# Patient Record
Sex: MALE | Race: WHITE
[De-identification: names, ages, dates, MRNs, and addresses within clinical notes are randomized per-mention and may not be internally consistent; named-entity substitution may affect disease eponyms.]

---

## 2017-05-12 ENCOUNTER — HOSPITAL ENCOUNTER (OUTPATIENT)
Dept: HOSPITAL 17 - HEND | Age: 82
End: 2017-05-12
Attending: INTERNAL MEDICINE
Payer: MEDICARE

## 2017-05-12 VITALS
RESPIRATION RATE: 16 BRPM | SYSTOLIC BLOOD PRESSURE: 124 MMHG | OXYGEN SATURATION: 97 % | HEART RATE: 72 BPM | DIASTOLIC BLOOD PRESSURE: 68 MMHG | TEMPERATURE: 98.4 F

## 2017-05-12 VITALS
DIASTOLIC BLOOD PRESSURE: 74 MMHG | SYSTOLIC BLOOD PRESSURE: 112 MMHG | HEART RATE: 71 BPM | OXYGEN SATURATION: 96 % | RESPIRATION RATE: 16 BRPM

## 2017-05-12 VITALS — HEIGHT: 72 IN | WEIGHT: 197.53 LBS | BODY MASS INDEX: 26.76 KG/M2

## 2017-05-12 VITALS — TEMPERATURE: 97.4 F

## 2017-05-12 DIAGNOSIS — R13.10: Primary | ICD-10-CM

## 2017-05-12 DIAGNOSIS — K22.2: ICD-10-CM

## 2017-05-12 DIAGNOSIS — K29.70: ICD-10-CM

## 2017-05-12 DIAGNOSIS — K44.9: ICD-10-CM

## 2017-05-12 DIAGNOSIS — K31.7: ICD-10-CM

## 2017-05-12 PROCEDURE — 43239 EGD BIOPSY SINGLE/MULTIPLE: CPT

## 2017-05-12 PROCEDURE — 88305 TISSUE EXAM BY PATHOLOGIST: CPT

## 2017-05-12 PROCEDURE — 00740: CPT

## 2017-05-12 PROCEDURE — 88312 SPECIAL STAINS GROUP 1: CPT

## 2017-05-12 PROCEDURE — C1769 GUIDE WIRE: HCPCS

## 2017-05-12 PROCEDURE — 43248 EGD GUIDE WIRE INSERTION: CPT

## 2017-05-12 NOTE — GIPROC
[facility]

[address] [facility]. [facility], [address]

 

 

EGD WITH DILATION PROCEDURE REPORT     EXAM DATE: 05/12/2017

 

PATIENT NAME:          Jose Garcia          MR#:       [id number]

YOB: 1930     VISIT #:     [id number]

ATTENDING:     Amanda Nathan MD     ORDER #:     [id number]

ASSISTANT:      Dio Calzada and Kaitlin Greer      STATUS:

outpatient

 

INDICATIONS:  The patient is a 86 yr old male here for an EGD with dilation due

to dysphagia, history of esophageal stricture

PROCEDURE PERFORMED:     EGD w/ biopsy

EGD w/ dilation of esophagus via guidewire

MEDICATIONS:     None and Per Anesthesia.

TOPICAL ANESTHETIC:      none

 

CONSENT: The patient understands the risks and benefits of the procedure and

understands that these risks include, but are not limited to: sedation,

allergic reaction, infection, perforation and/or bleeding. Alternative means of

evaluation and treatment include, among others: physical exam, x-rays, and/or

surgical intervention. The patient elects to proceed with this endoscopic

procedure.

 



medical equipment was checked for proper function. Hand hygiene and appropriate

measures for infection prevention was taken. After the risks, benefits and

alternatives of the procedure were thoroughly explained, Informed consent was

verified, confirmed and timeout was successfully executed by the treatment

team. The patient was anesthetized with topical anesthesia and the Pentax

[id number] endoscope was introduced through the mouth and advanced to the second

portion of the duodenum.  The instrument was slowly withdrawn as the mucosa was

fully examined.

Gastritis antrum-biopsy

gastric body polyps

stricture distal esophagus

hiatal hernia.

Dilation was performed at gastroesophageal junction.

 

DILATOR:     SIZE(S):     RESISTANCE:     HEME:     APPEARANCE:

Dilator: Savary over guidewire     Size(s): 14,15

COMMENT:

Retroflexed views revealed a hiatal hernia

 

 

 

ADVERSE EVENTS:     There were no complications.

IMPRESSIONS:     1.  Gastritis antrum-biopsy

gastric body polyps

stricture distal esophagus

hiatal hernia

2.  Retroflexed views revealed a hiatal hernia

 

RECOMMENDATIONS:     1.  Await biopsy results.  Biopsy results will not be ready

for 7-10 days.  If you don't hear from us in two weeks, call our office for

biopsy results.

2.  Begin feeding tomorrow

3.  Continue PPI

4.  Dilatations PRN

5.  Restart anticoagulation

REPEAT EXAM:     Return as needed for EGD with dilatation

 

___________________________________

Amanda Nathan MD

eSigned:  Amanda Nathan MD 05/12/2017 11:28 AM

 

 

cc: Farhat Degroot M.D.

 

 

 

 

PATIENT NAME:  Jose Garcia

MR#: [id number]

## 2017-07-22 ENCOUNTER — HOSPITAL ENCOUNTER (INPATIENT)
Dept: HOSPITAL 17 - NEPC | Age: 82
LOS: 10 days | Discharge: HOME HEALTH | DRG: 690 | End: 2017-08-01
Payer: MEDICARE

## 2017-07-22 VITALS
RESPIRATION RATE: 12 BRPM | DIASTOLIC BLOOD PRESSURE: 64 MMHG | HEART RATE: 82 BPM | OXYGEN SATURATION: 98 % | SYSTOLIC BLOOD PRESSURE: 120 MMHG

## 2017-07-22 VITALS
OXYGEN SATURATION: 97 % | RESPIRATION RATE: 14 BRPM | HEART RATE: 86 BPM | SYSTOLIC BLOOD PRESSURE: 118 MMHG | DIASTOLIC BLOOD PRESSURE: 59 MMHG | TEMPERATURE: 98.7 F

## 2017-07-22 VITALS
DIASTOLIC BLOOD PRESSURE: 59 MMHG | HEART RATE: 72 BPM | OXYGEN SATURATION: 97 % | RESPIRATION RATE: 16 BRPM | TEMPERATURE: 98.7 F | SYSTOLIC BLOOD PRESSURE: 118 MMHG

## 2017-07-22 VITALS
HEART RATE: 84 BPM | RESPIRATION RATE: 12 BRPM | OXYGEN SATURATION: 100 % | SYSTOLIC BLOOD PRESSURE: 116 MMHG | DIASTOLIC BLOOD PRESSURE: 60 MMHG

## 2017-07-22 VITALS — HEIGHT: 71 IN | BODY MASS INDEX: 26.85 KG/M2 | WEIGHT: 191.8 LBS

## 2017-07-22 DIAGNOSIS — N39.0: Primary | ICD-10-CM

## 2017-07-22 DIAGNOSIS — N18.2: ICD-10-CM

## 2017-07-22 DIAGNOSIS — K31.3: ICD-10-CM

## 2017-07-22 DIAGNOSIS — Z79.02: ICD-10-CM

## 2017-07-22 DIAGNOSIS — M25.462: ICD-10-CM

## 2017-07-22 DIAGNOSIS — E11.9: ICD-10-CM

## 2017-07-22 DIAGNOSIS — E78.5: ICD-10-CM

## 2017-07-22 DIAGNOSIS — N62: ICD-10-CM

## 2017-07-22 DIAGNOSIS — F03.90: ICD-10-CM

## 2017-07-22 DIAGNOSIS — N48.6: ICD-10-CM

## 2017-07-22 DIAGNOSIS — K57.90: ICD-10-CM

## 2017-07-22 DIAGNOSIS — B37.81: ICD-10-CM

## 2017-07-22 DIAGNOSIS — G25.0: ICD-10-CM

## 2017-07-22 DIAGNOSIS — B37.0: ICD-10-CM

## 2017-07-22 DIAGNOSIS — Z87.891: ICD-10-CM

## 2017-07-22 DIAGNOSIS — K21.0: ICD-10-CM

## 2017-07-22 DIAGNOSIS — I27.2: ICD-10-CM

## 2017-07-22 DIAGNOSIS — B96.4: ICD-10-CM

## 2017-07-22 DIAGNOSIS — K44.9: ICD-10-CM

## 2017-07-22 DIAGNOSIS — N52.9: ICD-10-CM

## 2017-07-22 DIAGNOSIS — K22.2: ICD-10-CM

## 2017-07-22 DIAGNOSIS — I70.0: ICD-10-CM

## 2017-07-22 DIAGNOSIS — I12.9: ICD-10-CM

## 2017-07-22 DIAGNOSIS — R13.10: ICD-10-CM

## 2017-07-22 DIAGNOSIS — K40.90: ICD-10-CM

## 2017-07-22 DIAGNOSIS — R53.1: ICD-10-CM

## 2017-07-22 DIAGNOSIS — R06.6: ICD-10-CM

## 2017-07-22 DIAGNOSIS — I48.91: ICD-10-CM

## 2017-07-22 DIAGNOSIS — N40.0: ICD-10-CM

## 2017-07-22 DIAGNOSIS — I77.89: ICD-10-CM

## 2017-07-22 DIAGNOSIS — K29.70: ICD-10-CM

## 2017-07-22 DIAGNOSIS — Z96.651: ICD-10-CM

## 2017-07-22 LAB
ALP SERPL-CCNC: 79 U/L (ref 45–117)
ALT SERPL-CCNC: 14 U/L (ref 12–78)
ANION GAP SERPL CALC-SCNC: 8 MEQ/L (ref 5–15)
APTT BLD: 31.4 SEC (ref 24.3–30.1)
AST SERPL-CCNC: 15 U/L (ref 15–37)
BACTERIA #/AREA URNS HPF: (no result) /HPF
BASOPHILS # BLD AUTO: 0 TH/MM3 (ref 0–0.2)
BASOPHILS NFR BLD: 0.2 % (ref 0–2)
BILIRUB SERPL-MCNC: 1.4 MG/DL (ref 0.2–1)
BUN SERPL-MCNC: 22 MG/DL (ref 7–18)
CHLORIDE SERPL-SCNC: 104 MEQ/L (ref 98–107)
CK MB SERPL-MCNC: (no result) NG/ML (ref 0.5–3.6)
CK SERPL-CCNC: 131 U/L (ref 39–308)
COLOR UR: YELLOW
COMMENT (UR): (no result)
CULTURE IF INDICATED: (no result)
EOSINOPHIL # BLD: 0 TH/MM3 (ref 0–0.4)
EOSINOPHIL NFR BLD: 0 % (ref 0–4)
ERYTHROCYTE [DISTWIDTH] IN BLOOD BY AUTOMATED COUNT: 13.9 % (ref 11.6–17.2)
GFR SERPLBLD BASED ON 1.73 SQ M-ARVRAT: 64 ML/MIN (ref 89–?)
GLUCOSE UR STRIP-MCNC: (no result) MG/DL
HCO3 BLD-SCNC: 23 MEQ/L (ref 21–32)
HCT VFR BLD CALC: 35.8 % (ref 39–51)
HEMO FLAGS: (no result)
HGB UR QL STRIP: (no result)
INR PPP: 1.2 RATIO
KETONES UR STRIP-MCNC: 10 MG/DL
LYMPHOCYTES # BLD AUTO: 0.7 TH/MM3 (ref 1–4.8)
LYMPHOCYTES NFR BLD AUTO: 4.9 % (ref 9–44)
MAGNESIUM SERPL-MCNC: 1.8 MG/DL (ref 1.5–2.5)
MCH RBC QN AUTO: 33.1 PG (ref 27–34)
MCHC RBC AUTO-ENTMCNC: 33.6 % (ref 32–36)
MCV RBC AUTO: 98.5 FL (ref 80–100)
MONOCYTES NFR BLD: 13.5 % (ref 0–8)
MUCOUS THREADS #/AREA URNS LPF: (no result) /LPF
NEUTROPHILS # BLD AUTO: 11.8 TH/MM3 (ref 1.8–7.7)
NEUTROPHILS NFR BLD AUTO: 81.4 % (ref 16–70)
NITRITE UR QL STRIP: (no result)
PLATELET # BLD: 176 TH/MM3 (ref 150–450)
POTASSIUM SERPL-SCNC: 4 MEQ/L (ref 3.5–5.1)
PROTHROMBIN TIME: 12.8 SEC (ref 9.8–11.6)
RBC # BLD AUTO: 3.63 MIL/MM3 (ref 4.5–5.9)
SODIUM SERPL-SCNC: 135 MEQ/L (ref 136–145)
SP GR UR STRIP: 1.02 (ref 1–1.03)
WBC # BLD AUTO: 14.5 TH/MM3 (ref 4–11)

## 2017-07-22 PROCEDURE — 86592 SYPHILIS TEST NON-TREP QUAL: CPT

## 2017-07-22 PROCEDURE — 70450 CT HEAD/BRAIN W/O DYE: CPT

## 2017-07-22 PROCEDURE — 87086 URINE CULTURE/COLONY COUNT: CPT

## 2017-07-22 PROCEDURE — 88305 TISSUE EXAM BY PATHOLOGIST: CPT

## 2017-07-22 PROCEDURE — 74230 X-RAY XM SWLNG FUNCJ C+: CPT

## 2017-07-22 PROCEDURE — 82607 VITAMIN B-12: CPT

## 2017-07-22 PROCEDURE — 84443 ASSAY THYROID STIM HORMONE: CPT

## 2017-07-22 PROCEDURE — 87077 CULTURE AEROBIC IDENTIFY: CPT

## 2017-07-22 PROCEDURE — 87040 BLOOD CULTURE FOR BACTERIA: CPT

## 2017-07-22 PROCEDURE — 96376 TX/PRO/DX INJ SAME DRUG ADON: CPT

## 2017-07-22 PROCEDURE — 71010: CPT

## 2017-07-22 PROCEDURE — 73502 X-RAY EXAM HIP UNI 2-3 VIEWS: CPT

## 2017-07-22 PROCEDURE — 82552 ASSAY OF CPK IN BLOOD: CPT

## 2017-07-22 PROCEDURE — 96365 THER/PROPH/DIAG IV INF INIT: CPT

## 2017-07-22 PROCEDURE — 93005 ELECTROCARDIOGRAM TRACING: CPT

## 2017-07-22 PROCEDURE — 96366 THER/PROPH/DIAG IV INF ADDON: CPT

## 2017-07-22 PROCEDURE — G0378 HOSPITAL OBSERVATION PER HR: HCPCS

## 2017-07-22 PROCEDURE — 85025 COMPLETE CBC W/AUTO DIFF WBC: CPT

## 2017-07-22 PROCEDURE — 96375 TX/PRO/DX INJ NEW DRUG ADDON: CPT

## 2017-07-22 PROCEDURE — 87186 SC STD MICRODIL/AGAR DIL: CPT

## 2017-07-22 PROCEDURE — 85610 PROTHROMBIN TIME: CPT

## 2017-07-22 PROCEDURE — 80053 COMPREHEN METABOLIC PANEL: CPT

## 2017-07-22 PROCEDURE — 83605 ASSAY OF LACTIC ACID: CPT

## 2017-07-22 PROCEDURE — 76937 US GUIDE VASCULAR ACCESS: CPT

## 2017-07-22 PROCEDURE — 96361 HYDRATE IV INFUSION ADD-ON: CPT

## 2017-07-22 PROCEDURE — 84484 ASSAY OF TROPONIN QUANT: CPT

## 2017-07-22 PROCEDURE — 85730 THROMBOPLASTIN TIME PARTIAL: CPT

## 2017-07-22 PROCEDURE — 82746 ASSAY OF FOLIC ACID SERUM: CPT

## 2017-07-22 PROCEDURE — C1769 GUIDE WIRE: HCPCS

## 2017-07-22 PROCEDURE — 82550 ASSAY OF CK (CPK): CPT

## 2017-07-22 PROCEDURE — 81001 URINALYSIS AUTO W/SCOPE: CPT

## 2017-07-22 PROCEDURE — 84439 ASSAY OF FREE THYROXINE: CPT

## 2017-07-22 PROCEDURE — 83735 ASSAY OF MAGNESIUM: CPT

## 2017-07-22 PROCEDURE — 82140 ASSAY OF AMMONIA: CPT

## 2017-07-22 PROCEDURE — 80048 BASIC METABOLIC PNL TOTAL CA: CPT

## 2017-07-22 PROCEDURE — 88312 SPECIAL STAINS GROUP 1: CPT

## 2017-07-22 PROCEDURE — 73560 X-RAY EXAM OF KNEE 1 OR 2: CPT

## 2017-07-22 NOTE — RADRPT
EXAM DATE/TIME:  07/22/2017 20:26 

 

[facility] COMPARISON:     

CHEST SINGLE AP, October 30, 2016, 1:26.

 

                     

INDICATIONS :     

Syncope. Weakness.

                     

 

MEDICAL HISTORY :     

None.          

 

SURGICAL HISTORY :     

None.   

 

ENCOUNTER:     

Initial                                        

 

ACUITY:     

1 day      

 

PAIN SCORE:     

5/10

 

LOCATION:     

Bilateral chest 

 

FINDINGS:     

A single view of the chest demonstrates the lungs to be symmetrically aerated without evidence of mas
s, infiltrate or effusion.  The cardiomediastinal contours are unremarkable.  Osseous structures are 
intact.

 

CONCLUSION:     No acute disease.  

 

 

 

 Giorgio Lechuga MD on July 22, 2017 at 20:41           

Board Certified Radiologist.

 This report was verified electronically.

## 2017-07-22 NOTE — RADRPT
EXAM DATE/TIME:  07/22/2017 20:39 

 

[facility] COMPARISON:     

No previous studies available for comparison.

 

 

INDICATIONS :     

General weakness.

                      

 

RADIATION DOSE:     

56.35 CTDIvol (mGy) 

 

 

 

MEDICAL HISTORY :     

Dementia. Gastroesophageal reflux disease. Myocardial infarction.Hypertension.

 

SURGICAL HISTORY :      

Appendectomy. Prostatectomy.TURP.

 

ENCOUNTER:      

Initial

 

ACUITY:      

1 day

 

PAIN SCALE:      

0/10

 

LOCATION:        

cranial 

 

TECHNIQUE:     

Multiple contiguous axial images were obtained of the head.  Using automated exposure control and adj
ustment of the mA and/or kV according to patient size, radiation dose was kept as low as reasonably a
chievable to obtain optimal diagnostic quality images.   DICOM format image data is available electro
nically for review and comparison.  

 

FINDINGS:     

There is marked central and cortical atrophy with dilatation of ventricular and sulcal spaces. Scatte
red low densities in white matter.  There is no parenchymal hemorrhage, acute infarction or mass lesi
on identified.  There are no extra-axial fluid collections appreciated.  The posterior fossa is unrem
arkable with midline fourth ventricle.  The portion of the orbits and paranasal sinuses visualized ar
e unremarkable. 

 

CONCLUSION:     

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

 

 

 

 Giorgio Lechuga MD on July 22, 2017 at 20:49           

Board Certified Radiologist.

 This report was verified electronically.

## 2017-07-22 NOTE — PD
HPI


Chief Complaint:  General Weakness


Time Seen by Provider:  20:37


Travel History


International Travel<30 days:  No


Contact w/Intl Traveler<30days:  No


Traveled to known affect area:  No





History of Present Illness


HPI


86-year-old male presents to the emergency department for evaluation of 

generalized weakness.  Patient has history of atrial fibrillation, hypertension

, diabetes, hyperlipidemia.  He is on Pradaxa for his A. fib.  The family 

denies any injury.  According to family, he was fine yesterday.  He saw Dr. Yanez who took him off his galantamine for dementia.  However, his 

weakness started before he was supposed to take the next dose of his 

galantamine.  Family states he has been so weak that he is hardly able to walk.

  He has had decreased appetite.  The family states that they have been having 

to help him walk which is not normal for him.  He normally gets around fine 

with his cane.  He denies any fevers or headache.  No chest pressure or 

shortness of breath.  No abdominal pain.  No nausea, vomiting, diarrhea.  

Patient is following asleep often during my exam.  They state that his symptoms 

started around 4:30 this morning.





PFSH


Past Medical History


Hx Anticoagulant Therapy:  Yes


Arthritis:  Yes


Cancer:  Yes


Cardiovascular Problems:  Yes (a fib)


High Cholesterol:  Yes


Chemotherapy:  No


Cerebrovascular Accident:  No


Diabetes:  No


Diminished Hearing:  No


Endocrine:  No


Gastrointestinal Disorders:  Yes (Reflux)


GERD:  Yes


Genitourinary:  No


Hepatitis:  No


Hiatal Hernia:  No


Hypertension:  Yes


Immune Disorder:  No


Implanted Vascular Access Dvce:  Yes


Musculoskeletal:  Yes (ARTHRITIS)


Neurologic:  Yes (DEMENTIA)


Psychiatric:  No


Reproductive:  No


Respiratory:  No


Thyroid Disease:  No





Past Surgical History


Abdominal Surgery:  No


AICD:  No


Appendectomy:  Yes


Body Medical Devices:  Right knee


Cardiac Surgery:  No


Ear Surgery:  No


Eye Surgery:  Yes (DETACHED RETINA REPAIR)


Genitourinary Surgery:  Yes


Gynecologic Surgery:  No


Joint Replacement:  Yes (R TKA)


Oral Surgery:  No


Pacemaker:  No


Prostatectomy:  Yes (Partial)


Thoracic Surgery:  No


Other Surgery:  Yes (Right knee, appentecdomy, TURP, esoph stricture stretched)





Social History


Alcohol Use:  Yes (2-3 times weekly)


Tobacco Use:  No (QUIT 1965)


Substance Use:  No





Allergies-Medications


(Allergen,Severity, Reaction):  


Coded Allergies:  


     Sulfa (Verified  Allergy, Severe, MIGRAINES, RED EYES, 7/22/17)


Reported Meds & Prescriptions





Reported Meds & Active Scripts


Active


Pradaxa (Dabigatran) 150 Mg Cap 150 Mg PO BID


     resume on 11/4


Reported


Galantamine ER (Galantamine Hydrobromide) 24 Mg Caper 24 Mg PO DAILY


Vitamin D3 (Cholecalciferol) 1,000 Unit Tab 1,000 Units PO DAILY


Pantoprazole (Pantoprazole Sodium) 40 Mg Tab 40 Mg PO BID


Memantine 10 Mg Tab 10 Mg PO BID


Norvasc (Amlodipine Besylate) 5 Mg Tab 5 Mg PO BID


Lipitor (Atorvastatin Calcium) 40 Mg Tab 40 Mg PO HS


Atenolol 25 Mg Tab 25 Mg PO DAILY








Review of Systems


Except as stated in HPI:  all other systems reviewed are Neg





Physical Exam


Narrative


GENERAL: Well-nourished, well-developed elderly male patient, afebrile.


SKIN: Focused skin assessment warm/dry.


HEAD: Normocephalic.  Atraumatic.


EYES: No scleral icterus. No injection or drainage. 


NECK: Supple, trachea midline. No JVD or lymphadenopathy.


CARDIOVASCULAR: Regular rate and rhythm without murmurs, gallops, or rubs. 


RESPIRATORY: Breath sounds equal bilaterally. No accessory muscle use.  Lungs 

sounds clear to auscultation.


GASTROINTESTINAL: Abdomen soft, non-tender, nondistended. 


MUSCULOSKELETAL: No cyanosis, or edema.  Bilateral upper and lower extremity 

strength 5/5.  All extremities are neurovascularly intact.


BACK: Nontender without obvious deformity. No CVA tenderness.





Data


Data


Last Documented VS





Vital Signs








  Date Time  Temp Pulse Resp B/P Pulse Ox O2 Delivery O2 Flow Rate FiO2


 


7/22/17 22:30  82 12 120/64 98   


 


7/22/17 19:46      Room Air  


 


7/22/17 19:46 98.7       








Orders





 Electrocardiogram (7/22/17 20:15)


Complete Blood Count With Diff (7/22/17 20:15)


Comprehensive Metabolic Panel (7/22/17 20:15)


Iv Access Insert/Monitor (7/22/17 20:15)


Lactic Acid (7/22/17 20:15)


Act Partial Throm Time (Ptt) (7/22/17 20:27)


Prothrombin Time / Inr (Pt) (7/22/17 20:27)


Urinalysis - C+S If Indicated (7/22/17 20:27)


Chest, Single Ap (7/22/17 20:27)


Ct Brain W/O Iv Contrast(Rout) (7/22/17 20:27)


Ecg Monitoring (7/22/17 20:27)


Oximetry (7/22/17 20:27)


Sodium Chloride 0.9% Flush (Ns Flush) (7/22/17 20:30)


Sodium Chlor 0.9% 1000 Ml Inj (Ns 1000 M (7/22/17 20:27)


Blood Culture (7/22/17 20:33)


Ckmb (Isoenzyme) Profile (7/22/17 20:18)


Magnesium (Mg) (7/22/17 20:18)


Troponin I (7/22/17 20:18)


CKMB (7/22/17 20:18)


CKMB% (7/22/17 20:18)


Cath For Specimen (7/22/17 21:40)


Urine Culture (7/22/17 22:09)


Ceftriaxone Inj (Rocephin Inj) (7/22/17 23:00)


Thyroid Stimulating Hormone (7/22/17 22:56)


Free Thyroxine (T4) (7/22/17 22:56)


Rapid Plasma Regin (Rpr) W Ttr (7/22/17 22:56)


Vitamin B12 (7/22/17 22:56)


Folate, Serum (7/22/17 22:56)


Ammonia (7/22/17 22:56)


Place In Observation (7/22/17 )


Code Status (7/22/17 22:56)


Vital Signs (Adult) Q4H (7/22/17 22:56)


Activity Oob With Assistance (7/22/17 22:56)


Cardiac Monitor / Telemetry .CONTINUOUS (7/22/17 22:56)


Diet Heart Healthy (7/23/17 Breakfast)


Sodium Chloride 0.9% Flush (Ns Flush) (7/22/17 23:00)


Sodium Chloride 0.9% Flush (Ns Flush) (7/23/17 09:00)


Acetaminophen (Tylenol) (7/22/17 23:00)


Ondansetron Inj (Zofran Inj) (7/22/17 23:00)


Basic Metabolic Panel (Bmp) (7/23/17 06:00)


Complete Blood Count With Diff (7/23/17 06:00)


Electrocardiogram (7/22/17 22:56)


Pt Request For Service (7/22/17 22:56)


Scd Bilateral/Knee High HUGH.BID (7/22/17 22:56)


Naloxone Inj (Narcan Inj) (7/22/17 23:00)


Magnesium Hydroxide Liq (Milk Of Magnesi (7/22/17 23:00)


Echo 2d Comp With Doppler (7/22/17 )


Amlodipine (Norvasc) (7/23/17 09:00)


Atenolol (Tenormin) (7/23/17 09:00)


Atorvastatin (Lipitor) (7/23/17 21:00)


Cholecalciferol (Vitamin D3) (7/23/17 09:00)


Dabigatran (Pradaxa) (7/23/17 09:00)


Memantine (Namenda) (7/23/17 09:00)


Pantoprazole (Protonix) (7/23/17 09:00)


Admit Order (Ed Use Only) (7/22/17 23:01)





Labs





 Laboratory Tests








Test 7/22/17 7/22/17 7/22/17





 20:18 20:30 22:09


 


White Blood Count 14.5 TH/MM3  


 


Red Blood Count 3.63 MIL/MM3  


 


Hemoglobin 12.0 GM/DL  


 


Hematocrit 35.8 %  


 


Mean Corpuscular Volume 98.5 FL  


 


Mean Corpuscular Hemoglobin 33.1 PG  


 


Mean Corpuscular Hemoglobin 33.6 %  





Concent   


 


Red Cell Distribution Width 13.9 %  


 


Platelet Count 176 TH/MM3  


 


Mean Platelet Volume 9.7 FL  


 


Neutrophils (%) (Auto) 81.4 %  


 


Lymphocytes (%) (Auto) 4.9 %  


 


Monocytes (%) (Auto) 13.5 %  


 


Eosinophils (%) (Auto) 0.0 %  


 


Basophils (%) (Auto) 0.2 %  


 


Neutrophils # (Auto) 11.8 TH/MM3  


 


Lymphocytes # (Auto) 0.7 TH/MM3  


 


Monocytes # (Auto) 1.9 TH/MM3  


 


Eosinophils # (Auto) 0.0 TH/MM3  


 


Basophils # (Auto) 0.0 TH/MM3  


 


CBC Comment DIFF FINAL   


 


Differential Comment    


 


Sodium Level 135 MEQ/L  


 


Potassium Level 4.0 MEQ/L  


 


Chloride Level 104 MEQ/L  


 


Carbon Dioxide Level 23.0 MEQ/L  


 


Anion Gap 8 MEQ/L  


 


Blood Urea Nitrogen 22 MG/DL  


 


Creatinine 1.09 MG/DL  


 


Estimat Glomerular Filtration 64 ML/MIN  





Rate   


 


Random Glucose 163 MG/DL  


 


Lactic Acid Level 1.9 mmol/L  


 


Calcium Level 8.5 MG/DL  


 


Magnesium Level 1.8 MG/DL  


 


Total Bilirubin 1.4 MG/DL  


 


Aspartate Amino Transf 15 U/L  





(AST/SGOT)   


 


Alanine Aminotransferase 14 U/L  





(ALT/SGPT)   


 


Alkaline Phosphatase 79 U/L  


 


Total Creatine Kinase 131 U/L  


 


Creatine Kinase MB LESS THAN 0.5  





 NG/ML  


 


Troponin I LESS THAN 0.02  





 NG/ML  


 


Total Protein 7.2 GM/DL  


 


Albumin 3.1 GM/DL  


 


Prothrombin Time  12.8 SEC 


 


Prothromb Time International  1.2 RATIO 





Ratio   


 


Activated Partial  31.4 SEC 





Thromboplast Time   


 


Urine Color   YELLOW 


 


Urine Turbidity   CLOUDY 


 


Urine pH   8.0 


 


Urine Specific Gravity   1.023 


 


Urine Protein   100 mg/dL


 


Urine Glucose (UA)   NEG mg/dL


 


Urine Ketones   10 mg/dL


 


Urine Occult Blood   SMALL 


 


Urine Nitrite   NEG 


 


Urine Bilirubin   NEG 


 


Urine Urobilinogen   LESS THAN 2.0





   MG/DL


 


Urine Leukocyte Esterase   LARGE 


 


Urine RBC   8 /hpf


 


Urine WBC    /hpf


 


Urine WBC Clumps   MANY 


 


Urine Triple Phosphate   OCC /hpf





Crystals   


 


Urine Bacteria   MANY /hpf


 


Urine Mucus   FEW /lpf


 


Microscopic Urinalysis Comment   CULTURE





   INDICATED











MDM


Medical Decision Making


Medical Screen Exam Complete:  Yes


Emergency Medical Condition:  Yes


Medical Record Reviewed:  Yes


Interpretation(s)


chest x-ray - CONCLUSION:     No acute disease.  





CT brain - CONCLUSION:     


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


Differential Diagnosis


Electrolyte abnormality versus dehydration versus pneumonia versus UTI versus 

sepsis versus intracranial abnormality


Narrative Course


86 year old elderly male presents to the emergency department with his 2 

daughters and wife at bedside for evaluation of generalized weakness that 

started this morning.  EKG shows atrial fibrillation, heart rate 89, no acute 

ST changes.  CBC, CMP, magnesium, CK, troponin, lactic acid, PTT, PT/INR, UA, 

blood cultures 2 are ordered and pending.  Chest x-ray and CT of the brain are 

ordered and pending.  Patient is given normal saline 1 L IV bolus.





CBC shows leukocytosis 14.5, hemoglobin 12.0, hematocrit 35.8, neutrophilia 

81.4.  CMP shows no acute abnormality.  Magnesium is 1.8.  CK is 131.  Troponin 

is less than 0.02.  Lactic acid is 1.9.  Coags show no acute abnormality.  UA 

shows large leukocyte esterase, 8 RBC, innumerable WBC, many wbc clumps, many 

bacteria.  Chest x-ray shows no acute disease.  CT of the brain shows cerebral 

atrophy and chronic ischemic small vessel vasculopathy.





Patient started on Rocephin 1 g IV.  [facility] is paged for admission.





Dr. Bonds accepted admission.





Diagnosis





 Primary Impression:  


 UTI (urinary tract infection)


 Qualified Code:  N30.01 - Acute cystitis with hematuria


 Additional Impression:  


 Generalized weakness





Admitting Information


Admitting Physician Requests:  Daiana Ledesma Jul 22, 2017 20:37

## 2017-07-23 VITALS
SYSTOLIC BLOOD PRESSURE: 130 MMHG | HEART RATE: 87 BPM | TEMPERATURE: 97.9 F | RESPIRATION RATE: 18 BRPM | DIASTOLIC BLOOD PRESSURE: 75 MMHG | OXYGEN SATURATION: 96 %

## 2017-07-23 VITALS
TEMPERATURE: 98.7 F | DIASTOLIC BLOOD PRESSURE: 76 MMHG | OXYGEN SATURATION: 92 % | SYSTOLIC BLOOD PRESSURE: 129 MMHG | RESPIRATION RATE: 18 BRPM | HEART RATE: 68 BPM

## 2017-07-23 VITALS
OXYGEN SATURATION: 96 % | RESPIRATION RATE: 18 BRPM | TEMPERATURE: 98.4 F | DIASTOLIC BLOOD PRESSURE: 67 MMHG | HEART RATE: 74 BPM | SYSTOLIC BLOOD PRESSURE: 129 MMHG

## 2017-07-23 VITALS
SYSTOLIC BLOOD PRESSURE: 137 MMHG | TEMPERATURE: 97.8 F | HEART RATE: 75 BPM | OXYGEN SATURATION: 96 % | DIASTOLIC BLOOD PRESSURE: 66 MMHG | RESPIRATION RATE: 18 BRPM

## 2017-07-23 VITALS
RESPIRATION RATE: 18 BRPM | HEART RATE: 68 BPM | OXYGEN SATURATION: 97 % | TEMPERATURE: 98 F | SYSTOLIC BLOOD PRESSURE: 115 MMHG | DIASTOLIC BLOOD PRESSURE: 67 MMHG

## 2017-07-23 VITALS
DIASTOLIC BLOOD PRESSURE: 65 MMHG | SYSTOLIC BLOOD PRESSURE: 132 MMHG | RESPIRATION RATE: 20 BRPM | HEART RATE: 85 BPM | TEMPERATURE: 97.8 F | OXYGEN SATURATION: 96 %

## 2017-07-23 VITALS — HEART RATE: 82 BPM

## 2017-07-23 VITALS — HEART RATE: 71 BPM

## 2017-07-23 VITALS
TEMPERATURE: 98.1 F | HEART RATE: 92 BPM | SYSTOLIC BLOOD PRESSURE: 130 MMHG | OXYGEN SATURATION: 92 % | DIASTOLIC BLOOD PRESSURE: 74 MMHG | RESPIRATION RATE: 18 BRPM

## 2017-07-23 VITALS — HEART RATE: 79 BPM

## 2017-07-23 VITALS — HEART RATE: 78 BPM

## 2017-07-23 LAB
ANION GAP SERPL CALC-SCNC: 8 MEQ/L (ref 5–15)
BASOPHILS # BLD AUTO: 0.1 TH/MM3 (ref 0–0.2)
BASOPHILS NFR BLD: 0.5 % (ref 0–2)
BUN SERPL-MCNC: 18 MG/DL (ref 7–18)
CHLORIDE SERPL-SCNC: 105 MEQ/L (ref 98–107)
EOSINOPHIL # BLD: 0 TH/MM3 (ref 0–0.4)
EOSINOPHIL NFR BLD: 0 % (ref 0–4)
ERYTHROCYTE [DISTWIDTH] IN BLOOD BY AUTOMATED COUNT: 14 % (ref 11.6–17.2)
GFR SERPLBLD BASED ON 1.73 SQ M-ARVRAT: 87 ML/MIN (ref 89–?)
HCO3 BLD-SCNC: 23.8 MEQ/L (ref 21–32)
HCT VFR BLD CALC: 37.3 % (ref 39–51)
HEMO FLAGS: (no result)
LYMPHOCYTES # BLD AUTO: 1.3 TH/MM3 (ref 1–4.8)
LYMPHOCYTES NFR BLD AUTO: 9.5 % (ref 9–44)
MCH RBC QN AUTO: 33.4 PG (ref 27–34)
MCHC RBC AUTO-ENTMCNC: 34.5 % (ref 32–36)
MCV RBC AUTO: 96.9 FL (ref 80–100)
MONOCYTES NFR BLD: 12.6 % (ref 0–8)
NEUTROPHILS # BLD AUTO: 10.3 TH/MM3 (ref 1.8–7.7)
NEUTROPHILS NFR BLD AUTO: 77.4 % (ref 16–70)
PLATELET # BLD: 167 TH/MM3 (ref 150–450)
POTASSIUM SERPL-SCNC: 3.4 MEQ/L (ref 3.5–5.1)
RBC # BLD AUTO: 3.85 MIL/MM3 (ref 4.5–5.9)
SODIUM SERPL-SCNC: 137 MEQ/L (ref 136–145)
T4 FREE SERPL-MCNC: 1.45 NG/DL (ref 0.76–1.46)
VIT B12 SERPL-MCNC: 217 PG/ML (ref 193–986)
WBC # BLD AUTO: 13.2 TH/MM3 (ref 4–11)

## 2017-07-23 RX ADMIN — MEMANTINE HYDROCHLORIDE SCH MG: 10 TABLET ORAL at 09:00

## 2017-07-23 RX ADMIN — PHENAZOPYRIDINE SCH MG: 100 TABLET ORAL at 17:03

## 2017-07-23 RX ADMIN — PANTOPRAZOLE SCH MG: 40 TABLET, DELAYED RELEASE ORAL at 20:08

## 2017-07-23 RX ADMIN — PANTOPRAZOLE SCH MG: 40 TABLET, DELAYED RELEASE ORAL at 09:00

## 2017-07-23 RX ADMIN — SODIUM BICARBONATE SCH MLS/HR: 84 INJECTION, SOLUTION INTRAVENOUS at 17:03

## 2017-07-23 RX ADMIN — Medication SCH ML: at 20:13

## 2017-07-23 RX ADMIN — DABIGATRAN ETEXILATE MESYLATE SCH MG: 150 CAPSULE ORAL at 20:08

## 2017-07-23 RX ADMIN — Medication SCH ML: at 09:00

## 2017-07-23 RX ADMIN — VITAMIN D, TAB 1000IU (100/BT) SCH UNITS: 25 TAB at 09:00

## 2017-07-23 RX ADMIN — MEMANTINE HYDROCHLORIDE SCH MG: 10 TABLET ORAL at 20:09

## 2017-07-23 RX ADMIN — ATENOLOL SCH MG: 25 TABLET ORAL at 09:00

## 2017-07-23 RX ADMIN — ATORVASTATIN CALCIUM SCH MG: 40 TABLET, FILM COATED ORAL at 20:08

## 2017-07-23 RX ADMIN — DABIGATRAN ETEXILATE MESYLATE SCH MG: 150 CAPSULE ORAL at 09:00

## 2017-07-23 NOTE — HHI.HP
HPI


Service


[facility]


Primary Care Physician


Farhat Degroot MD


Admission Diagnosis


UTI, generalized weakness


Chief Complaint:  


worsening generalized weakness.


Travel History


International Travel<30 Days:  No


Contact w/Intl Traveler <30 Da:  No


Traveled to Known Affected Are:  No


History of Present Illness


Patient is a pleasant 86-year-old male with multiple medical problems including 

hypertension, atrial fibrillation, hyperlipidemia, and dementia.


Most recently patient was hospitalized at [facility] October 30 through November 3

, 2016 due to esophageal stricture, food bolus obstruction of the esophagus, 

and new onset atrial fibrillation.  Patient now returns to the [facility] ER with 

complaints of worsening generalized weakness.  Patient's wife explains that he 

has been having a gradual decline.  Previously patient ambulated with a cane 

only outside of the house, but now he needs the cane to ambulate even with in 

the house.  His fatigue has greatly worsened over the last few days. Patient 

became fatigued yesterday to the point where he was unable to ambulate without 

significant assistance from his family.  Workup in the ER revealed urinary 

tract infection and patient was started on intravenous Rocephin.  Patient 

admitted to [facility] for further evaluation and treatment.





I reviewed patient's last neurology visit with Dr. Yanez (7/21/17).  

Patient complained of fatigue at that time as well.  Dr. Yanez was 

concerned that possibly patient's weakness could be related to his galantamine 

and asked that the family hold the galantamine for a week and see if his 

symptoms improved.





Patient denies chest pain, palpitations, nausea and vomiting, diaphoresis, or 

worsening shortness of breath.





Past Family Social History


Past Medical History





1) hypertension


2) atrial fibrillation


   - on pradaxa


   - pt follows with Dr. Pang


3) hyperlipidemia


4) dementia


5) esophageal stricture, status post dilation


6) pulmonary hypertension, mild


7) GERD


8) BPH


   - TURP 11/18/13   


   - Pt's Urologist is Dr. Greco


9) diverticulosis


10) Peyronie's disease/erectile dysfunction, patient follows with urology


11) small right inguinal hernia


   - Evaluated by surgery in the past but surgical correction not recommended


12) essential tremor


13) carotid disease, mild


14) chronic kidney disease, stage II


15) bilateral gynecomastia


16) atherosclerosis of the aorta


Past Surgical History





 1. History of Appendectomy


  1939


2. History of Cataract Surgery


  left eye in 2008


3. History of Complete Colonoscopy


  Colonoscopy on: 6-15-04 (diverticuloisis in  the descending and sigmoid


    colon); 7-11-11 (normal colon)


4. History of Diagnostic Esophagogastroduodenoscopy


  12/09/2010 by Dr. Nathan revealed gastritis in the antrum, stricture at the GE


    junction dilated to 14 mm and a sessile polyp in the body of the


    stomach. Pathology revealed mild chronic active gastritis and a hyperplastic


    polyp.f/u in six weeks.EGD on: 10-30-16 (food impaction distal esophagus;


    stricture of distal esophagus); 11-3-16 (dilation of an esophageal stricture

,


    gastritis, gastric polyps, hiatal hernia)


5. History of Discission Of Secondary Membranous Cataract By Laser


6. History of Esophageal Dilation


  patient has had multiple dilations in the past. Last two: 9-25-05; 12-10-9-09


    (EGD with dilation of stricture at GE junction, gastritis, polyp in stomach)


7. History of Repair Of Retinal Detachment


  left eye 1995


8. History of Sigmoidoscopy (Fiberoptic)


9. History of Stress Test By Pharmacologic Challenge


  A myocardial perfusion scan on 10-3-16 was negative with an EF of 68%.


10. History of Total Knee Arthroplasty


  right knee 1998


11. History of Transurethral Resection Of Prostate (TURP)


  TURP on 11-18-13


Reported Medications





Reported Meds & Active Scripts


Active


Pradaxa (Dabigatran) 150 Mg Cap 150 Mg PO BID


     resume on 11/4


Reported


Galantamine ER (Galantamine Hydrobromide) 24 Mg Caper 24 Mg PO DAILY


Vitamin D3 (Cholecalciferol) 1,000 Unit Tab 1,000 Units PO DAILY


Pantoprazole (Pantoprazole Sodium) 40 Mg Tab 40 Mg PO BID


Memantine 10 Mg Tab 10 Mg PO BID


Norvasc (Amlodipine Besylate) 5 Mg Tab 5 Mg PO BID


Lipitor (Atorvastatin Calcium) 40 Mg Tab 40 Mg PO HS


Atenolol 25 Mg Tab 25 Mg PO DAILY


Allergies:  


Coded Allergies:  


     Sulfa (Verified  Allergy, Severe, MIGRAINES, RED EYES, 7/22/17)


Family History


Noncontributory


Social History


- 


- Retired


- Former smoker


- No alcohol use 


- no illicit street drugs


-





Physical Exam


Vital Signs





 Vital Signs








  Date Time  Temp Pulse Resp B/P Pulse Ox O2 Delivery O2 Flow Rate FiO2


 


7/23/17 10:58  78      


 


7/23/17 08:14 97.8 75 18 137/66 96   


 


7/23/17 05:13 98.4 74 18 129/67 96   


 


7/23/17 02:14  79      


 


7/23/17 00:06 98.0 68 18 115/67 97   


 


7/22/17 22:30  82 12 120/64 98   


 


7/22/17 21:20  84 12 116/60 100   


 


7/22/17 19:46  82 16  98 Room Air  


 


7/22/17 19:46 98.7 86 14 118/59 97   


 


7/22/17 19:42 98.7 72 16 118/59 97   








Physical Exam


GENERAL: This is a well-nourished, well-developed patient, in no apparent 

distress.


SKIN: No rashes, ecchymoses or lesions. Cool and dry.


HEAD: Atraumatic. Normocephalic. No temporal or scalp tenderness.


EYES: Pupils equal round and reactive. Extraocular motions intact. No scleral 

icterus. No injection or drainage. 


ENT: Nose without bleeding, purulent drainage or septal hematoma. Throat 

without erythema, tonsillar hypertrophy or exudate. Uvula midline. Airway 

patent.


NECK: Trachea midline. No JVD or lymphadenopathy. Supple, nontender, no 

meningeal signs.


CARDIOVASCULAR: Regular rate and rhythm without murmurs, gallops, or rubs. 


RESPIRATORY: Clear to auscultation. Breath sounds equal bilaterally. No wheezes

, rales, or rhonchi.  


GASTROINTESTINAL: Abdomen soft, non-tender, nondistended. No hepato-splenomegaly

, or palpable masses. No guarding.


MUSCULOSKELETAL: Extremities without clubbing, cyanosis, or edema. No joint 

tenderness, effusion, or edema noted. No calf tenderness. Negative Homans sign 

bilaterally.


NEUROLOGICAL: Awake and alert. Cranial nerves II through XII intact.  Motor and 

sensory grossly within normal limits. Five out of 5 muscle strength in all 

muscle groups.  Normal speech.


Laboratory





Laboratory Tests








Test 7/22/17 7/22/17 7/22/17 7/23/17





 20:18 20:30 22:09 08:15


 


White Blood Count 14.5    13.2 


 


Red Blood Count 3.63    3.85 


 


Hemoglobin 12.0    12.9 


 


Hematocrit 35.8    37.3 


 


Mean Corpuscular Volume 98.5    96.9 


 


Mean Corpuscular Hemoglobin 33.1    33.4 


 


Mean Corpuscular Hemoglobin 33.6    34.5 





Concent    


 


Red Cell Distribution Width 13.9    14.0 


 


Platelet Count 176    167 


 


Mean Platelet Volume 9.7    9.5 


 


Neutrophils (%) (Auto) 81.4    77.4 


 


Lymphocytes (%) (Auto) 4.9    9.5 


 


Monocytes (%) (Auto) 13.5    12.6 


 


Eosinophils (%) (Auto) 0.0    0.0 


 


Basophils (%) (Auto) 0.2    0.5 


 


Neutrophils # (Auto) 11.8    10.3 


 


Lymphocytes # (Auto) 0.7    1.3 


 


Monocytes # (Auto) 1.9    1.7 


 


Eosinophils # (Auto) 0.0    0.0 


 


Basophils # (Auto) 0.0    0.1 


 


CBC Comment DIFF FINAL    DIFF FINAL 


 


Differential Comment      


 


Sodium Level 135    137 


 


Potassium Level 4.0    3.4 


 


Chloride Level 104    105 


 


Carbon Dioxide Level 23.0    23.8 


 


Anion Gap 8    8 


 


Blood Urea Nitrogen 22    18 


 


Creatinine 1.09    0.84 


 


Estimat Glomerular Filtration 64    87 





Rate    


 


Random Glucose 163    121 


 


Lactic Acid Level 1.9    


 


Calcium Level 8.5    8.7 


 


Magnesium Level 1.8    


 


Total Bilirubin 1.4    


 


Aspartate Amino Transf 15    





(AST/SGOT)    


 


Alanine Aminotransferase 14    





(ALT/SGPT)    


 


Alkaline Phosphatase 79    


 


Total Creatine Kinase 131    


 


Creatine Kinase MB LESS THAN 0.5    


 


Troponin I LESS THAN 0.02    


 


Total Protein 7.2    


 


Albumin 3.1    


 


Vitamin B12 Level 217    


 


Folate 15.8    


 


Free Thyroxine 1.45    


 


Thyroid Stimulating Hormone 0.893    





3rd Gen    


 


Prothrombin Time  12.8   


 


Prothromb Time International  1.2   





Ratio    


 


Activated Partial  31.4   





Thromboplast Time    


 


Urine Color   YELLOW  


 


Urine Turbidity   CLOUDY  


 


Urine pH   8.0  


 


Urine Specific Gravity   1.023  


 


Urine Protein   100  


 


Urine Glucose (UA)   NEG  


 


Urine Ketones   10  


 


Urine Occult Blood   SMALL  


 


Urine Nitrite   NEG  


 


Urine Bilirubin   NEG  


 


Urine Urobilinogen   LESS THAN 2.0  


 


Urine Leukocyte Esterase   LARGE  


 


Urine RBC   8  


 


Urine WBC     


 


Urine WBC Clumps   MANY  


 


Urine Triple Phosphate   OCC  





Crystals    


 


Urine Bacteria   MANY  


 


Urine Mucus   FEW  


 


Microscopic Urinalysis Comment   CULTURE 





   INDICATED 


 


Ammonia    14 














 Date/Time Procedure Status





Source Growth 


 


 7/22/17 22:09 Urine Culture Received





Urine Random Urine Pending 


 


 7/22/17 21:38 Aerobic Blood Culture - Preliminary Resulted





Blood Peripheral NO GROWTH IN 1 DAY 





 7/22/17 21:38 Anaerobic Blood Culture - Preliminary Resulted





Blood Peripheral NO GROWTH IN 1 DAY 








Result Diagram:  


7/23/17 0815                                                                   

             7/23/17 0815





Imaging





Last Impressions








Head CT 7/22/17 2027 Signed





Impressions: 





 Service Date/Time:  Saturday, July 22, 2017 20:39 - CONCLUSION:  Cerebral 





 atrophy and chronic ischemic small vessel vasculopathy.     Giorgio Lechuga MD 


 


Chest X-Ray 7/22/17 2027 Signed





Impressions: 





 Service Date/Time:  Saturday, July 22, 2017 20:26 - CONCLUSION: No acute 





 disease.       Giorgio Lechuga MD 











Septic Shock Reassessment


Heart:  Regular rate and rhythm


Lungs:  Clear


Skin:  Warm


Peripheral Pulses:     Bounding Right Radial


         Bounding Left Radial


         Bounding Right Popliteal


         Bounding Left Popliteal


         Bounding Right Dorsalis Pedis


         Bounding Left Dorsalis Pedis


         Bounding Right Posterior Tibial


         Bounding Left Posterior Tibial


Capillary Refill:  Brisk





Assessment and Plan


Problem List:  


(1) UTI (urinary tract infection)


Status:  Acute


Plan:  


- Patient started on Rocephin


- Awaiting urine culture





(2) Generalized weakness


Status:  Acute


Plan:  


- Likely exacerbated by patient's urinary tract infection


- Echocardiogram pending


- Continue physical therapy


- Patient would benefit from SNF at the end of this hospitalization


- anticipate d/c to SNF 7/24/17


- Family specifically would like Mr. Garcia to go to [facility].  I 

will defer that process to case management.





(3) Atrial fibrillation


Status:  Chronic


Plan:  


- Atenolol, pradaxa





(4) HTN (hypertension)


Status:  Chronic


Plan:  


- stable


- metoprolol





(5) Dementia


Status:  Acute


Plan:  


- Patient follows with Dr. Yanez


- Patient instructed by  been awake to hold his gland to mean for a week to 

see if that was causing his fatigue


- Namenda





(6) Diabetes mellitus type 2, diet-controlled


Status:  Chronic


Plan:  


- Diet controlled





(7) Esophageal stricture


Status:  Acute


Plan:  - s/p dilation


- PPI








Problem Qualifiers





(1) UTI (urinary tract infection):  


Qualified Code:  N30.01 - Acute cystitis with hematuria


(2) Atrial fibrillation:  


Qualified Code:  I48.2 - Chronic atrial fibrillation


(3) HTN (hypertension):  


Qualified Code:  I10 - Essential hypertension





Horacio Bonds DO Jul 23, 2017 12:16

## 2017-07-24 VITALS
SYSTOLIC BLOOD PRESSURE: 119 MMHG | RESPIRATION RATE: 18 BRPM | HEART RATE: 83 BPM | DIASTOLIC BLOOD PRESSURE: 84 MMHG | TEMPERATURE: 98.4 F | OXYGEN SATURATION: 94 %

## 2017-07-24 VITALS
HEART RATE: 107 BPM | SYSTOLIC BLOOD PRESSURE: 144 MMHG | RESPIRATION RATE: 20 BRPM | TEMPERATURE: 97.9 F | DIASTOLIC BLOOD PRESSURE: 79 MMHG | OXYGEN SATURATION: 95 %

## 2017-07-24 VITALS
TEMPERATURE: 99 F | RESPIRATION RATE: 18 BRPM | SYSTOLIC BLOOD PRESSURE: 119 MMHG | HEART RATE: 87 BPM | OXYGEN SATURATION: 94 % | DIASTOLIC BLOOD PRESSURE: 68 MMHG

## 2017-07-24 VITALS
RESPIRATION RATE: 14 BRPM | HEART RATE: 88 BPM | SYSTOLIC BLOOD PRESSURE: 131 MMHG | OXYGEN SATURATION: 95 % | DIASTOLIC BLOOD PRESSURE: 68 MMHG | TEMPERATURE: 98.1 F

## 2017-07-24 VITALS
DIASTOLIC BLOOD PRESSURE: 93 MMHG | OXYGEN SATURATION: 92 % | RESPIRATION RATE: 18 BRPM | TEMPERATURE: 98.1 F | HEART RATE: 95 BPM | SYSTOLIC BLOOD PRESSURE: 142 MMHG

## 2017-07-24 VITALS — OXYGEN SATURATION: 95 %

## 2017-07-24 VITALS — HEART RATE: 80 BPM

## 2017-07-24 LAB
ANION GAP SERPL CALC-SCNC: 8 MEQ/L (ref 5–15)
BASOPHILS # BLD AUTO: 0 TH/MM3 (ref 0–0.2)
BASOPHILS NFR BLD: 0.4 % (ref 0–2)
BUN SERPL-MCNC: 18 MG/DL (ref 7–18)
CHLORIDE SERPL-SCNC: 105 MEQ/L (ref 98–107)
EOSINOPHIL # BLD: 0.1 TH/MM3 (ref 0–0.4)
EOSINOPHIL NFR BLD: 0.5 % (ref 0–4)
ERYTHROCYTE [DISTWIDTH] IN BLOOD BY AUTOMATED COUNT: 13.7 % (ref 11.6–17.2)
GFR SERPLBLD BASED ON 1.73 SQ M-ARVRAT: 102 ML/MIN (ref 89–?)
HCO3 BLD-SCNC: 24.1 MEQ/L (ref 21–32)
HCT VFR BLD CALC: 35.9 % (ref 39–51)
HEMO FLAGS: (no result)
LYMPHOCYTES # BLD AUTO: 1.3 TH/MM3 (ref 1–4.8)
LYMPHOCYTES NFR BLD AUTO: 11.7 % (ref 9–44)
MAGNESIUM SERPL-MCNC: 2 MG/DL (ref 1.5–2.5)
MCH RBC QN AUTO: 33.6 PG (ref 27–34)
MCHC RBC AUTO-ENTMCNC: 34.4 % (ref 32–36)
MCV RBC AUTO: 97.9 FL (ref 80–100)
MONOCYTES NFR BLD: 13.4 % (ref 0–8)
NEUTROPHILS # BLD AUTO: 8.1 TH/MM3 (ref 1.8–7.7)
NEUTROPHILS NFR BLD AUTO: 74 % (ref 16–70)
PLATELET # BLD: 184 TH/MM3 (ref 150–450)
POTASSIUM SERPL-SCNC: 3.9 MEQ/L (ref 3.5–5.1)
RAPID PLASMA REAGIN SCREEN: (no result)
RBC # BLD AUTO: 3.67 MIL/MM3 (ref 4.5–5.9)
SODIUM SERPL-SCNC: 137 MEQ/L (ref 136–145)
WBC # BLD AUTO: 11 TH/MM3 (ref 4–11)

## 2017-07-24 RX ADMIN — MEMANTINE HYDROCHLORIDE SCH MG: 10 TABLET ORAL at 08:20

## 2017-07-24 RX ADMIN — Medication SCH ML: at 20:09

## 2017-07-24 RX ADMIN — PHENAZOPYRIDINE SCH MG: 100 TABLET ORAL at 00:58

## 2017-07-24 RX ADMIN — Medication SCH ML: at 08:20

## 2017-07-24 RX ADMIN — ATORVASTATIN CALCIUM SCH MG: 40 TABLET, FILM COATED ORAL at 20:08

## 2017-07-24 RX ADMIN — SODIUM BICARBONATE SCH MLS/HR: 84 INJECTION, SOLUTION INTRAVENOUS at 04:29

## 2017-07-24 RX ADMIN — PHENAZOPYRIDINE SCH MG: 100 TABLET ORAL at 08:20

## 2017-07-24 RX ADMIN — PANTOPRAZOLE SCH MG: 40 TABLET, DELAYED RELEASE ORAL at 08:20

## 2017-07-24 RX ADMIN — ATENOLOL SCH MG: 25 TABLET ORAL at 08:20

## 2017-07-24 RX ADMIN — MEMANTINE HYDROCHLORIDE SCH MG: 10 TABLET ORAL at 20:08

## 2017-07-24 RX ADMIN — DABIGATRAN ETEXILATE MESYLATE SCH MG: 150 CAPSULE ORAL at 08:20

## 2017-07-24 RX ADMIN — PHENAZOPYRIDINE SCH MG: 100 TABLET ORAL at 15:50

## 2017-07-24 RX ADMIN — CIPROFLOXACIN HYDROCHLORIDE SCH MG: 500 TABLET, FILM COATED ORAL at 20:08

## 2017-07-24 RX ADMIN — VITAMIN D, TAB 1000IU (100/BT) SCH UNITS: 25 TAB at 08:20

## 2017-07-24 RX ADMIN — PANTOPRAZOLE SCH MG: 40 TABLET, DELAYED RELEASE ORAL at 20:08

## 2017-07-24 RX ADMIN — DABIGATRAN ETEXILATE MESYLATE SCH MG: 150 CAPSULE ORAL at 20:08

## 2017-07-24 NOTE — HHI.PR
Subjective


Remarks


No new complaints. 


Afebrile


Wife present at bedside and is agreeable to rehab.





Objective


Vitals





 Vital Signs








  Date Time  Temp Pulse Resp B/P Pulse Ox O2 Delivery O2 Flow Rate FiO2


 


7/24/17 08:06     95 Nasal Cannula 2.00 


 


7/24/17 07:43 98.1 88 14 131/68 95   


 


7/24/17 04:24 98.1 95 18 142/93 92   


 


7/23/17 23:45 98.7 68 18 129/76 92   


 


7/23/17 20:23  82      


 


7/23/17 19:38 98.1 92 18 130/74 92   


 


7/23/17 16:10 97.8 85 20 132/65 96   


 


7/23/17 14:56  71      


 


7/23/17 12:00 97.9 87 18 130/75 96   


 


7/23/17 10:58  78      








Result Diagram:  


7/24/17 0706                                                                   

             7/24/17 0706





Other Results





 Laboratory Tests








Test 7/22/17 7/22/17 7/22/17 7/23/17





 20:18 20:30 22:09 08:15


 


White Blood Count 14.5 TH/MM3   13.2 TH/MM3


 


Red Blood Count 3.63 MIL/MM3   3.85 MIL/MM3


 


Hemoglobin 12.0 GM/DL   12.9 GM/DL


 


Hematocrit 35.8 %   37.3 %


 


Mean Corpuscular Volume 98.5 FL   96.9 FL


 


Mean Corpuscular Hemoglobin 33.1 PG   33.4 PG


 


Mean Corpuscular Hemoglobin 33.6 %   34.5 %





Concent    


 


Red Cell Distribution Width 13.9 %   14.0 %


 


Platelet Count 176 TH/MM3   167 TH/MM3


 


Mean Platelet Volume 9.7 FL   9.5 FL


 


Neutrophils (%) (Auto) 81.4 %   77.4 %


 


Lymphocytes (%) (Auto) 4.9 %   9.5 %


 


Monocytes (%) (Auto) 13.5 %   12.6 %


 


Eosinophils (%) (Auto) 0.0 %   0.0 %


 


Basophils (%) (Auto) 0.2 %   0.5 %


 


Neutrophils # (Auto) 11.8 TH/MM3   10.3 TH/MM3


 


Lymphocytes # (Auto) 0.7 TH/MM3   1.3 TH/MM3


 


Monocytes # (Auto) 1.9 TH/MM3   1.7 TH/MM3


 


Eosinophils # (Auto) 0.0 TH/MM3   0.0 TH/MM3


 


Basophils # (Auto) 0.0 TH/MM3   0.1 TH/MM3


 


CBC Comment DIFF FINAL    DIFF FINAL 


 


Differential Comment      


 


Sodium Level 135 MEQ/L   137 MEQ/L


 


Potassium Level 4.0 MEQ/L   3.4 MEQ/L


 


Chloride Level 104 MEQ/L   105 MEQ/L


 


Carbon Dioxide Level 23.0 MEQ/L   23.8 MEQ/L


 


Anion Gap 8 MEQ/L   8 MEQ/L


 


Blood Urea Nitrogen 22 MG/DL   18 MG/DL


 


Creatinine 1.09 MG/DL   0.84 MG/DL


 


Estimat Glomerular Filtration 64 ML/MIN   87 ML/MIN





Rate    


 


Random Glucose 163 MG/DL   121 MG/DL


 


Lactic Acid Level 1.9 mmol/L   


 


Calcium Level 8.5 MG/DL   8.7 MG/DL


 


Magnesium Level 1.8 MG/DL   


 


Total Bilirubin 1.4 MG/DL   


 


Aspartate Amino Transf 15 U/L   





(AST/SGOT)    


 


Alanine Aminotransferase 14 U/L   





(ALT/SGPT)    


 


Alkaline Phosphatase 79 U/L   


 


Total Creatine Kinase 131 U/L   


 


Creatine Kinase MB LESS THAN 0.5   





 NG/ML   


 


Troponin I LESS THAN 0.02   





 NG/ML   


 


Total Protein 7.2 GM/DL   


 


Albumin 3.1 GM/DL   


 


Vitamin B12 Level 217 PG/ML   


 


Folate 15.8 NG/ML   


 


Free Thyroxine 1.45 NG/DL   


 


Thyroid Stimulating Hormone 0.893 uIU/ML   





3rd Gen    


 


Prothrombin Time  12.8 SEC  


 


Prothromb Time International  1.2 RATIO  





Ratio    


 


Activated Partial  31.4 SEC  





Thromboplast Time    


 


Urine Color   YELLOW  


 


Urine Turbidity   CLOUDY  


 


Urine pH   8.0  


 


Urine Specific Gravity   1.023  


 


Urine Protein   100 mg/dL 


 


Urine Glucose (UA)   NEG mg/dL 


 


Urine Ketones   10 mg/dL 


 


Urine Occult Blood   SMALL  


 


Urine Nitrite   NEG  


 


Urine Bilirubin   NEG  


 


Urine Urobilinogen   LESS THAN 2.0 





   MG/DL 


 


Urine Leukocyte Esterase   LARGE  


 


Urine RBC   8 /hpf 


 


Urine WBC    /hpf 


 


Urine WBC Clumps   MANY  


 


Urine Triple Phosphate   OCC /hpf 





Crystals    


 


Urine Bacteria   MANY /hpf 


 


Urine Mucus   FEW /lpf 


 


Microscopic Urinalysis Comment   CULTURE 





   INDICATED 


 


Ammonia    14 MCMOL/L


 


Rapid Plasma Reagin    NON-REACTIVE 


 


    





Test 7/24/17   





 07:06   


 


White Blood Count 11.0 TH/MM3   


 


Red Blood Count 3.67 MIL/MM3   


 


Hemoglobin 12.3 GM/DL   


 


Hematocrit 35.9 %   


 


Mean Corpuscular Volume 97.9 FL   


 


Mean Corpuscular Hemoglobin 33.6 PG   


 


Mean Corpuscular Hemoglobin 34.4 %   





Concent    


 


Red Cell Distribution Width 13.7 %   


 


Platelet Count 184 TH/MM3   


 


Mean Platelet Volume 9.5 FL   


 


Neutrophils (%) (Auto) 74.0 %   


 


Lymphocytes (%) (Auto) 11.7 %   


 


Monocytes (%) (Auto) 13.4 %   


 


Eosinophils (%) (Auto) 0.5 %   


 


Basophils (%) (Auto) 0.4 %   


 


Neutrophils # (Auto) 8.1 TH/MM3   


 


Lymphocytes # (Auto) 1.3 TH/MM3   


 


Monocytes # (Auto) 1.5 TH/MM3   


 


Eosinophils # (Auto) 0.1 TH/MM3   


 


Basophils # (Auto) 0.0 TH/MM3   


 


CBC Comment DIFF FINAL    


 


Differential Comment     


 


Sodium Level 137 MEQ/L   


 


Potassium Level 3.9 MEQ/L   


 


Chloride Level 105 MEQ/L   


 


Carbon Dioxide Level 24.1 MEQ/L   


 


Anion Gap 8 MEQ/L   


 


Blood Urea Nitrogen 18 MG/DL   


 


Creatinine 0.73 MG/DL   


 


Estimat Glomerular Filtration 102 ML/MIN   





Rate    


 


Random Glucose 101 MG/DL   


 


Calcium Level 8.7 MG/DL   


 


Magnesium Level 2.0 MG/DL   








Imaging





Last Impressions








Head CT 7/22/17 2027 Signed





Impressions: 





 Service Date/Time:  Saturday, July 22, 2017 20:39 - CONCLUSION:  Cerebral 





 atrophy and chronic ischemic small vessel vasculopathy.     Giorgio Lechuga MD 


 


Chest X-Ray 7/22/17 2027 Signed





Impressions: 





 Service Date/Time:  Saturday, July 22, 2017 20:26 - CONCLUSION: No acute 





 disease.       Giorgio Lechuga MD 








Objective Remarks


General: NAD, Alert and oriented to self


Chest: CTA


Cardiac: Irregular


Abd: +BS, soft ND/NT


Ext: No edema





A/P


Problem List:  


(1) UTI (urinary tract infection)


Status:  Acute


Plan:  


- Patient started on Rocephin


- Urine culture with Gram Neg rods, awaiting final culture and sensitivities


- Anticipate discharge to SNF once final cultures and sensitivities resulted. 





(2) Generalized weakness


Status:  Acute


Plan:  


- Likely exacerbated by patient's urinary tract infection


- PT recommending rehab at SNF





(3) Atrial fibrillation


Status:  Chronic


Plan:  


- Atenolol, pradaxa





(4) HTN (hypertension)


Status:  Chronic


Plan:  


- stable


- metoprolol





(5) Dementia


Status:  Acute


Plan:  


- Patient follows with Dr. Yanez


- Patient instructed by  been awake to hold his galantamine for a week to 

see if that was causing his fatigue


- Namenda





(6) Diabetes mellitus type 2, diet-controlled


Status:  Chronic


Plan:  


- Diet controlled





(7) Esophageal stricture


Status:  Acute


Plan:  - s/p dilation


- PPI





Assessment and Plan


Patient examined.


Assessment and plan formulated with Sara Roa PA-C.


I agree with the above.


dementia. gen weakness. uti.


d/c to snf in AM





Problem Qualifiers





(1) UTI (urinary tract infection):  


Qualified Code:  N30.01 - Acute cystitis with hematuria


(2) Atrial fibrillation:  


Qualified Code:  I48.2 - Chronic atrial fibrillation


(3) HTN (hypertension):  


Qualified Code:  I10 - Essential hypertension





Sara Roa Jul 24, 2017 11:01


Tan Gutierrez MD Jul 24, 2017 21:50

## 2017-07-24 NOTE — EKG
Date Performed: 07/22/2017       Time Performed: 19:44:59

 

PTAGE:      86 years

 

EKG:      ATRIAL FIBRILLATION WITH ABERRANT CONDUCTION OR VENTRICULAR PREMATURE COMPLEXES NONSPECIFIC
 ST & T-WAVE ABNORMALITY Consider anterolateral ischemia ABNORMAL RHYTHM ECG

 

PREVIOUS TRACING       : 10/29/2016 20.45

 

DOCTOR:   Jonathan Polo  Interpretating Date/Time  07/24/2017 09:29:00

## 2017-07-25 VITALS
DIASTOLIC BLOOD PRESSURE: 72 MMHG | OXYGEN SATURATION: 95 % | RESPIRATION RATE: 20 BRPM | SYSTOLIC BLOOD PRESSURE: 110 MMHG | HEART RATE: 67 BPM | TEMPERATURE: 97.5 F

## 2017-07-25 VITALS
DIASTOLIC BLOOD PRESSURE: 65 MMHG | OXYGEN SATURATION: 94 % | HEART RATE: 89 BPM | RESPIRATION RATE: 18 BRPM | TEMPERATURE: 98.4 F | SYSTOLIC BLOOD PRESSURE: 133 MMHG

## 2017-07-25 VITALS
OXYGEN SATURATION: 94 % | DIASTOLIC BLOOD PRESSURE: 65 MMHG | TEMPERATURE: 98 F | RESPIRATION RATE: 19 BRPM | HEART RATE: 90 BPM | SYSTOLIC BLOOD PRESSURE: 123 MMHG

## 2017-07-25 VITALS — HEART RATE: 82 BPM

## 2017-07-25 VITALS
DIASTOLIC BLOOD PRESSURE: 61 MMHG | RESPIRATION RATE: 19 BRPM | TEMPERATURE: 98.1 F | HEART RATE: 86 BPM | OXYGEN SATURATION: 95 % | SYSTOLIC BLOOD PRESSURE: 114 MMHG

## 2017-07-25 VITALS — HEART RATE: 84 BPM

## 2017-07-25 VITALS — HEART RATE: 86 BPM

## 2017-07-25 VITALS — HEART RATE: 81 BPM

## 2017-07-25 RX ADMIN — PHENAZOPYRIDINE SCH MG: 100 TABLET ORAL at 17:47

## 2017-07-25 RX ADMIN — VITAMIN D, TAB 1000IU (100/BT) SCH UNITS: 25 TAB at 11:04

## 2017-07-25 RX ADMIN — CIPROFLOXACIN HYDROCHLORIDE SCH MG: 500 TABLET, FILM COATED ORAL at 22:01

## 2017-07-25 RX ADMIN — Medication SCH ML: at 22:01

## 2017-07-25 RX ADMIN — Medication SCH ML: at 11:05

## 2017-07-25 RX ADMIN — PANTOPRAZOLE SCH MG: 40 TABLET, DELAYED RELEASE ORAL at 22:01

## 2017-07-25 RX ADMIN — LORATADINE SCH MG: 10 TABLET ORAL at 11:43

## 2017-07-25 RX ADMIN — PANTOPRAZOLE SCH MG: 40 TABLET, DELAYED RELEASE ORAL at 11:04

## 2017-07-25 RX ADMIN — ATENOLOL SCH MG: 25 TABLET ORAL at 11:04

## 2017-07-25 RX ADMIN — CIPROFLOXACIN HYDROCHLORIDE SCH MG: 500 TABLET, FILM COATED ORAL at 11:04

## 2017-07-25 RX ADMIN — MEMANTINE HYDROCHLORIDE SCH MG: 10 TABLET ORAL at 22:01

## 2017-07-25 RX ADMIN — DABIGATRAN ETEXILATE MESYLATE SCH MG: 150 CAPSULE ORAL at 22:30

## 2017-07-25 RX ADMIN — PHENAZOPYRIDINE SCH MG: 100 TABLET ORAL at 11:05

## 2017-07-25 RX ADMIN — ATORVASTATIN CALCIUM SCH MG: 40 TABLET, FILM COATED ORAL at 22:01

## 2017-07-25 RX ADMIN — MEMANTINE HYDROCHLORIDE SCH MG: 10 TABLET ORAL at 11:04

## 2017-07-25 RX ADMIN — PHENAZOPYRIDINE SCH MG: 100 TABLET ORAL at 00:23

## 2017-07-25 RX ADMIN — DABIGATRAN ETEXILATE MESYLATE SCH MG: 150 CAPSULE ORAL at 11:05

## 2017-07-25 RX ADMIN — PHENYTOIN SODIUM SCH MLS/HR: 50 INJECTION INTRAMUSCULAR; INTRAVENOUS at 17:47

## 2017-07-25 NOTE — HHI.PR
Subjective


Remarks


Pt complains of more coughing today and is bringing up a lot of phlegm


Pt feels this is more upper sinus congestion


He is not eating much


Wife now reports that prior to admission he had an episode of coughing/choking 

with eating


 and is concerned about aspiration as he has had aspiration pneumonia in the 

past.





Objective


Vitals





 Vital Signs








  Date Time  Temp Pulse Resp B/P Pulse Ox O2 Delivery O2 Flow Rate FiO2


 


7/25/17 07:10 98.4 89 18 133/65 94   


 


7/25/17 02:19  82      


 


7/24/17 19:03 98.4 83 18 119/84 94   


 


7/24/17 16:00 97.9 107 20 144/79 95   


 


7/24/17 12:00 99.0 87 18 119/68 94   








Result Diagram:  


7/24/17 0706 7/24/17 0706





Other Results





 Laboratory Tests








Test 7/24/17





 07:06


 


White Blood Count 11.0 TH/MM3


 


Red Blood Count 3.67 MIL/MM3


 


Hemoglobin 12.3 GM/DL


 


Hematocrit 35.9 %


 


Mean Corpuscular Volume 97.9 FL


 


Mean Corpuscular Hemoglobin 33.6 PG


 


Mean Corpuscular Hemoglobin 34.4 %





Concent 


 


Red Cell Distribution Width 13.7 %


 


Platelet Count 184 TH/MM3


 


Mean Platelet Volume 9.5 FL


 


Neutrophils (%) (Auto) 74.0 %


 


Lymphocytes (%) (Auto) 11.7 %


 


Monocytes (%) (Auto) 13.4 %


 


Eosinophils (%) (Auto) 0.5 %


 


Basophils (%) (Auto) 0.4 %


 


Neutrophils # (Auto) 8.1 TH/MM3


 


Lymphocytes # (Auto) 1.3 TH/MM3


 


Monocytes # (Auto) 1.5 TH/MM3


 


Eosinophils # (Auto) 0.1 TH/MM3


 


Basophils # (Auto) 0.0 TH/MM3


 


CBC Comment DIFF FINAL 


 


Differential Comment  


 


Sodium Level 137 MEQ/L


 


Potassium Level 3.9 MEQ/L


 


Chloride Level 105 MEQ/L


 


Carbon Dioxide Level 24.1 MEQ/L


 


Anion Gap 8 MEQ/L


 


Blood Urea Nitrogen 18 MG/DL


 


Creatinine 0.73 MG/DL


 


Estimat Glomerular Filtration 102 ML/MIN





Rate 


 


Random Glucose 101 MG/DL


 


Calcium Level 8.7 MG/DL


 


Magnesium Level 2.0 MG/DL








Imaging





Last Impressions








Head CT 7/22/17 2027 Signed





Impressions: 





 Service Date/Time:  Saturday, July 22, 2017 20:39 - CONCLUSION:  Cerebral 





 atrophy and chronic ischemic small vessel vasculopathy.     Giorgio Lechuga MD 


 


Chest X-Ray 7/22/17 2027 Signed





Impressions: 





 Service Date/Time:  Saturday, July 22, 2017 20:26 - CONCLUSION: No acute 





 disease.       Giorgio Lechuga MD 








Objective Remarks


General: NAD, Alert and oriented to self


Chest: CTA


Cardiac: Irregular


Abd: +BS, soft ND/NT


Ext: No edema





A/P


Problem List:  


(1) UTI (urinary tract infection)


Status:  Acute


Plan:  


- Patient started on Rocephin


- Urine culture growing out Proteus Mirabilis with sensitivity to Cipro


- Antibiotics changed to Cipro on 7/24


- Anticipate discharge to SNF once final cultures and sensitivities resulted. 





(2) Cough


Status:  Acute


Plan:  


- Pt with more coughing today and is bringing up a lot of phlegm today


- Pt feels this is more upper sinus congestion


- Denies any dysphagia and coughing with eating but his wife now reports that 

prior to admission he 


 had an episode of coughing/choking with eating and is concerned about 

aspiration as he has had 


 aspiration pneumonia in the past. 


- Check CXR now


- Claritin 








(3) Generalized weakness


Status:  Acute


Plan:  


- Likely exacerbated by patient's urinary tract infection


- PT recommending rehab at SNF





(4) Atrial fibrillation


Status:  Chronic


Plan:  


- Atenolol, pradaxa





(5) HTN (hypertension)


Status:  Chronic


Plan:  


- stable


- metoprolol





(6) Dementia


Status:  Acute


Plan:  


- Patient follows with Dr. Yanez


- Patient instructed by  been awake to hold his galantamine for a week to 

see if that was causing his fatigue


- Namenda





(7) Diabetes mellitus type 2, diet-controlled


Status:  Chronic


Plan:  


- Diet controlled





(8) Esophageal stricture


Status:  Acute


Plan:  - s/p dilation


- PPI





Assessment and Plan


Patient examined.


Assessment and plan formulated with Sara Roa PA-C.


I agree with the above.


uti with AMS better. family concerned with cough. no pna


on cxr. pt thinks it's post nasal gtt. check swallow eval.


d/c to snf.





Problem Qualifiers





(1) UTI (urinary tract infection):  


Qualified Code:  N30.01 - Acute cystitis with hematuria


(2) Atrial fibrillation:  


Qualified Code:  I48.2 - Chronic atrial fibrillation


(3) HTN (hypertension):  


Qualified Code:  I10 - Essential hypertension





Sara Roa Jul 25, 2017 10:37


Tan Gutierrez MD Jul 25, 2017 15:31

## 2017-07-25 NOTE — RADRPT
EXAM DATE/TIME:  07/25/2017 11:33 

 

[facility] COMPARISON:     

CHEST SINGLE AP, July 22, 2017, 20:26.

 

                     

INDICATIONS :     

Cough.

                     

 

MEDICAL HISTORY :     

Gastroesophageal reflux disease.  Hypertension  Myocardial infarction.   Dementia.   

 

SURGICAL HISTORY :     

Appendectomy. Prostatectomy.  TURP.

 

ENCOUNTER:     

Subsequent                                        

 

ACUITY:     

3 days      

 

PAIN SCORE:     

0/10

 

LOCATION:     

Bilateral chest 

 

FINDINGS:     

A single view of the chest demonstrates the lungs to be hypoinflated with some minimal interstitial c
hanges predominantly in the left base. There may be punctate granulomatous type calcifications in the
 left lower lung as well. Lungs are otherwise clear with no acute infiltrate. There is some blunting 
of the left costophrenic angle and therefore, a small effusion or pleural parenchymal scarring cannot
 be excluded. Accounting for low lung lines heart size is normal. Degenerative changes of the dorsal 
spine. Osseous structures are otherwise intact.

 

CONCLUSION:     

1. Minimal interstitial changes in the left lower lung field with no confluent infiltrate.

2. Blunting of the left costophrenic angle may represent pleural parenchymal scarring or a small effu
myriam.

3. Old granulomatous disease. Heart size is normal.

 

 

 

 Domingo Hahn MD on July 25, 2017 at 12:13           

Board Certified Radiologist.

 This report was verified electronically.

## 2017-07-26 VITALS
TEMPERATURE: 97.7 F | HEART RATE: 88 BPM | SYSTOLIC BLOOD PRESSURE: 128 MMHG | DIASTOLIC BLOOD PRESSURE: 76 MMHG | RESPIRATION RATE: 19 BRPM | OXYGEN SATURATION: 95 %

## 2017-07-26 VITALS
HEART RATE: 89 BPM | SYSTOLIC BLOOD PRESSURE: 115 MMHG | RESPIRATION RATE: 18 BRPM | DIASTOLIC BLOOD PRESSURE: 76 MMHG | TEMPERATURE: 97.9 F | OXYGEN SATURATION: 96 %

## 2017-07-26 VITALS
TEMPERATURE: 97 F | OXYGEN SATURATION: 96 % | SYSTOLIC BLOOD PRESSURE: 134 MMHG | DIASTOLIC BLOOD PRESSURE: 78 MMHG | HEART RATE: 69 BPM | RESPIRATION RATE: 18 BRPM

## 2017-07-26 VITALS
DIASTOLIC BLOOD PRESSURE: 80 MMHG | OXYGEN SATURATION: 94 % | SYSTOLIC BLOOD PRESSURE: 137 MMHG | RESPIRATION RATE: 18 BRPM | TEMPERATURE: 97.9 F | HEART RATE: 81 BPM

## 2017-07-26 VITALS
SYSTOLIC BLOOD PRESSURE: 125 MMHG | DIASTOLIC BLOOD PRESSURE: 80 MMHG | RESPIRATION RATE: 18 BRPM | TEMPERATURE: 98.2 F | HEART RATE: 85 BPM | OXYGEN SATURATION: 96 %

## 2017-07-26 VITALS — HEART RATE: 65 BPM

## 2017-07-26 RX ADMIN — DABIGATRAN ETEXILATE MESYLATE SCH MG: 150 CAPSULE ORAL at 20:53

## 2017-07-26 RX ADMIN — ATORVASTATIN CALCIUM SCH MG: 40 TABLET, FILM COATED ORAL at 20:53

## 2017-07-26 RX ADMIN — VITAMIN D, TAB 1000IU (100/BT) SCH UNITS: 25 TAB at 08:57

## 2017-07-26 RX ADMIN — Medication SCH ML: at 08:58

## 2017-07-26 RX ADMIN — PANTOPRAZOLE SCH MG: 40 TABLET, DELAYED RELEASE ORAL at 08:54

## 2017-07-26 RX ADMIN — PHENAZOPYRIDINE SCH MG: 100 TABLET ORAL at 17:23

## 2017-07-26 RX ADMIN — PHENYTOIN SODIUM SCH MLS/HR: 50 INJECTION INTRAMUSCULAR; INTRAVENOUS at 12:45

## 2017-07-26 RX ADMIN — CIPROFLOXACIN HYDROCHLORIDE SCH MG: 500 TABLET, FILM COATED ORAL at 08:57

## 2017-07-26 RX ADMIN — PHENAZOPYRIDINE SCH MG: 100 TABLET ORAL at 01:38

## 2017-07-26 RX ADMIN — MEMANTINE HYDROCHLORIDE SCH MG: 10 TABLET ORAL at 08:56

## 2017-07-26 RX ADMIN — MEMANTINE HYDROCHLORIDE SCH MG: 10 TABLET ORAL at 20:53

## 2017-07-26 RX ADMIN — ATENOLOL SCH MG: 25 TABLET ORAL at 08:54

## 2017-07-26 RX ADMIN — PHENAZOPYRIDINE SCH MG: 100 TABLET ORAL at 08:54

## 2017-07-26 RX ADMIN — CIPROFLOXACIN HYDROCHLORIDE SCH MG: 500 TABLET, FILM COATED ORAL at 20:53

## 2017-07-26 RX ADMIN — Medication SCH ML: at 20:55

## 2017-07-26 RX ADMIN — DABIGATRAN ETEXILATE MESYLATE SCH MG: 150 CAPSULE ORAL at 08:55

## 2017-07-26 RX ADMIN — LORATADINE SCH MG: 10 TABLET ORAL at 08:56

## 2017-07-26 RX ADMIN — PANTOPRAZOLE SCH MG: 40 TABLET, DELAYED RELEASE ORAL at 20:53

## 2017-07-26 NOTE — HHI.PR
Subjective


Remarks


pt building alot of oral secretions. not obviously post nasal


foamy and alot of cough.





Objective


Vitals


alot of foamy secretion in back of throat.


heart reg


lung course upper airway sounds. clear


  with cough


abd s/nt


ext no edema


 Vital Signs








  Date Time  Temp Pulse Resp B/P Pulse Ox O2 Delivery O2 Flow Rate FiO2


 


7/26/17 08:00 97.7 88 19 128/76 95   


 


7/26/17 04:00 97.0 69 18 134/78 96   


 


7/25/17 22:00 97.5 67 20 110/72 95   


 


7/25/17 15:30  86      


 


7/25/17 15:14 98.0 90 19 123/65 94   


 


7/25/17 12:15  84      


 


7/25/17 12:04 98.1 86 19 114/61 95   














 7/25/17 7/25/17 7/26/17





 15:00 23:00 07:00


 


Intake Total 720 ml  


 


Balance 720 ml  


 


   


 


Intake Oral 720 ml  


 


# Voids 1  4


 


# Bowel Movements   3








Result Diagram:  


7/24/17 0706 7/24/17 0706





Imaging





Last Impressions








Head CT 7/22/17 2027 Signed





Impressions: 





 Service Date/Time:  Saturday, July 22, 2017 20:39 - CONCLUSION:  Cerebral 





 atrophy and chronic ischemic small vessel vasculopathy.     Giorgio Lechuga MD 


 


Chest X-Ray 7/22/17 2027 Signed





Impressions: 





 Service Date/Time:  Saturday, July 22, 2017 20:26 - CONCLUSION: No acute 





 disease.       Giorgio Lechuga MD 











A/P


Problem List:  


(1) Dysphagia


Status:  Acute


Plan:  Pt has hx aspiration, esophageal stricture and dilation


now with alot of post pharynx secretions. cough and aspiration.





speech and barium swallow ordered.


will ask his GI doctor to evaluate as he seems to have trouble


  handling his own secretion even w/out eating..high risk of aspiration.





(2) UTI (urinary tract infection)


Status:  Acute


Plan:  


- Patient started on Rocephin


- Urine culture growing out Proteus Mirabilis with sensitivity to Cipro


- Antibiotics changed to Cipro on 7/24





(3) Esophageal stricture


Status:  Chronic


Plan:  - s/p dilation


- PPI





(4) Generalized weakness


Status:  Acute


Plan:  


- Likely exacerbated by patient's urinary tract infection


- PT recommending rehab at SNF





(5) Atrial fibrillation


Status:  Chronic


Plan:  


- Atenolol, pradaxa





(6) HTN (hypertension)


Status:  Chronic


Plan:  


- stable


- metoprolol





(7) Dementia


Status:  Chronic


Plan:  


- Patient follows with Dr. Yanez


- Patient instructed by  been awake to hold his galantamine for a week to 

see if that was causing his fatigue


- Namenda





(8) Diabetes mellitus type 2, diet-controlled


Status:  Chronic


Plan:  


- Diet controlled








Problem Qualifiers





(1) UTI (urinary tract infection):  


Qualified Code:  N30.01 - Acute cystitis with hematuria


(2) Atrial fibrillation:  


Qualified Code:  I48.2 - Chronic atrial fibrillation


(3) HTN (hypertension):  


Qualified Code:  I10 - Essential hypertension





Tan Gutierrez MD Jul 26, 2017 10:42

## 2017-07-26 NOTE — RADRPT
EXAM DATE/TIME:  07/26/2017 15:53 

 

[facility] COMPARISON:     

No previous studies available for comparison.

 

                     

INDICATIONS :     

Dysphagial; coughing while eating. Possible aspiration. 

                     

 

FLUORO TIME:     

1.1 minutes

 

IMAGE COUNT:     

0

                     

 

CONTRAST:     

Dose as prescribed by speech pathologist.

                     

 

MEDICAL HISTORY :     

Gastroesophageal reflux disease. Hypertension Myocardial infarction. Dementia.   

 

SURGICAL HISTORY :     

None.   

 

ENCOUNTER:     

Initial                                        

 

ACUITY:     

2 weeks      

 

PAIN SCORE:     

0/10

 

LOCATION:     

Esophagus. 

 

FINDINGS:      

A modified barium swallow was performed with Speech Pathology.  The patient was given barium, applesa
uce thick barium and barium coated cracker.  There is no penetration of the supraglottic larynx or tr
acheal aspiration.  Minimal pooling occurs within the paired vallecula and piriform sinuses.  The ora
l phase is normal. 

 

CONCLUSION:     

 

1.  No penetration of the supraglottic larynx or tracheal aspiration.  

2.  Minimal pooling within the paired vallecula and piriform sinuses.  

 

 Branden Shipley MD on July 26, 2017 at 10:38                

Board Certified Radiologist.

 This report was verified electronically.

## 2017-07-27 VITALS
OXYGEN SATURATION: 93 % | DIASTOLIC BLOOD PRESSURE: 90 MMHG | SYSTOLIC BLOOD PRESSURE: 148 MMHG | RESPIRATION RATE: 17 BRPM | TEMPERATURE: 98.1 F | HEART RATE: 88 BPM

## 2017-07-27 VITALS
HEART RATE: 88 BPM | SYSTOLIC BLOOD PRESSURE: 135 MMHG | DIASTOLIC BLOOD PRESSURE: 76 MMHG | OXYGEN SATURATION: 93 % | TEMPERATURE: 97.7 F | RESPIRATION RATE: 18 BRPM

## 2017-07-27 VITALS
TEMPERATURE: 97.8 F | DIASTOLIC BLOOD PRESSURE: 88 MMHG | OXYGEN SATURATION: 95 % | SYSTOLIC BLOOD PRESSURE: 133 MMHG | RESPIRATION RATE: 18 BRPM | HEART RATE: 90 BPM

## 2017-07-27 VITALS
SYSTOLIC BLOOD PRESSURE: 117 MMHG | DIASTOLIC BLOOD PRESSURE: 73 MMHG | TEMPERATURE: 97.5 F | OXYGEN SATURATION: 94 % | HEART RATE: 79 BPM | RESPIRATION RATE: 20 BRPM

## 2017-07-27 VITALS
SYSTOLIC BLOOD PRESSURE: 143 MMHG | HEART RATE: 89 BPM | DIASTOLIC BLOOD PRESSURE: 80 MMHG | RESPIRATION RATE: 18 BRPM | TEMPERATURE: 98.2 F | OXYGEN SATURATION: 96 %

## 2017-07-27 VITALS
TEMPERATURE: 97.4 F | RESPIRATION RATE: 18 BRPM | DIASTOLIC BLOOD PRESSURE: 80 MMHG | HEART RATE: 96 BPM | OXYGEN SATURATION: 96 % | SYSTOLIC BLOOD PRESSURE: 105 MMHG

## 2017-07-27 VITALS — HEART RATE: 88 BPM

## 2017-07-27 RX ADMIN — MEMANTINE HYDROCHLORIDE SCH MG: 10 TABLET ORAL at 23:18

## 2017-07-27 RX ADMIN — PANTOPRAZOLE SCH MG: 40 TABLET, DELAYED RELEASE ORAL at 09:49

## 2017-07-27 RX ADMIN — PHENAZOPYRIDINE SCH MG: 100 TABLET ORAL at 18:12

## 2017-07-27 RX ADMIN — ATORVASTATIN CALCIUM SCH MG: 40 TABLET, FILM COATED ORAL at 23:18

## 2017-07-27 RX ADMIN — PHENYTOIN SODIUM SCH MLS/HR: 50 INJECTION INTRAMUSCULAR; INTRAVENOUS at 09:54

## 2017-07-27 RX ADMIN — PHENAZOPYRIDINE SCH MG: 100 TABLET ORAL at 09:49

## 2017-07-27 RX ADMIN — ATENOLOL SCH MG: 25 TABLET ORAL at 09:49

## 2017-07-27 RX ADMIN — DABIGATRAN ETEXILATE MESYLATE SCH MG: 150 CAPSULE ORAL at 09:49

## 2017-07-27 RX ADMIN — Medication SCH ML: at 09:53

## 2017-07-27 RX ADMIN — PHENAZOPYRIDINE SCH MG: 100 TABLET ORAL at 23:18

## 2017-07-27 RX ADMIN — VITAMIN D, TAB 1000IU (100/BT) SCH UNITS: 25 TAB at 09:49

## 2017-07-27 RX ADMIN — PANTOPRAZOLE SCH MG: 40 TABLET, DELAYED RELEASE ORAL at 23:18

## 2017-07-27 RX ADMIN — Medication SCH ML: at 21:00

## 2017-07-27 RX ADMIN — CIPROFLOXACIN HYDROCHLORIDE SCH MG: 500 TABLET, FILM COATED ORAL at 23:18

## 2017-07-27 RX ADMIN — ACETAMINOPHEN PRN MG: 325 TABLET ORAL at 23:17

## 2017-07-27 RX ADMIN — PHENAZOPYRIDINE SCH MG: 100 TABLET ORAL at 01:30

## 2017-07-27 RX ADMIN — MEMANTINE HYDROCHLORIDE SCH MG: 10 TABLET ORAL at 09:49

## 2017-07-27 RX ADMIN — CIPROFLOXACIN HYDROCHLORIDE SCH MG: 500 TABLET, FILM COATED ORAL at 09:49

## 2017-07-27 RX ADMIN — LORATADINE SCH MG: 10 TABLET ORAL at 09:49

## 2017-07-27 NOTE — PD.CONS
HPI


History of Present Illness


This is a 86 year old male with hx esophageal strictures, dementia, aspiration 

PNA, AF on pradaxa who presented to the hospital with weakness.  He was found 

to have UTI.  He has been having trouble swallowing, failed a swallow eval.  

Subsequent swallow eval was better with recommendation for thin liquids and 

soft diet. MBS showed no aspiration and minimal pooling.  His wife reports that 

he is having foamy saliva and seems to have difficulty swallowing hsi 

secretions.  He has hx intermittent dysphagia and food getting stuck, has had 

mult EGDs and dilatations, retrieval food boluses.  He was seen 11/207 and 

spirated during MBS and got PNA.  His last EGD with dilatation was 5/2017 by Dr Nathan with recommendation to repeat in 2months.   (Marion Olea)





PFSH


Past Medical History


AF


dementia


esohpageal strictures


HTN


aspiration PNA


Past Surgical History


appendectomy


cataract surgery - left eye


repiar detached retina


TURP


right knee replacement (Marion Olea)


Coded Allergies:  


     Sulfa (Verified  Allergy, Severe, MIGRAINES, RED EYES, 7/22/17)


Family History


non contributory


Social History


no ETOH, former smoker, no illicit drug use (Marion Olea)





Review of Systems


ROS


non contributory (Marion Olea)





GI Exam


Vitals I&O





 Vital Signs








  Date Time  Temp Pulse Resp B/P Pulse Ox O2 Delivery O2 Flow Rate FiO2


 


7/27/17 16:25 97.5 79 20 117/73 94   


 


7/27/17 11:58 97.4 96 18 105/80 96   


 


7/27/17 08:21 97.8 90 18 133/88 95   


 


7/27/17 04:00 98.1 88 17 148/90 93   


 


7/27/17 00:00 98.2 89 18 143/80 96   


 


7/26/17 21:00  65      


 


7/26/17 20:00 97.9 89 18 115/76 96   








 I/O








 7/26/17 7/26/17 7/26/17 7/27/17 7/27/17 7/27/17





 07:00 15:00 23:00 07:00 15:00 23:00


 


Output Total  100 ml 300 ml   


 


Balance  -100 ml -300 ml   


 


      


 


Output Urine Total  100 ml 300 ml   


 


# Voids 4   2  


 


# Bowel Movements 3  0 0  








Imaging





Last Impressions








Modified Barium Swallow 7/26/17 1553 Signed





Impressions: 





 Service Date/Time:  Wednesday, July 26, 2017 15:53 - CONCLUSION:   1.  No 





 penetration of the supraglottic larynx or tracheal aspiration.   2.  Minimal 





 pooling within the paired vallecula and piriform sinuses.     Branden Shipley MD 


 


Chest X-Ray 7/25/17 0000 Signed





Impressions: 





 Service Date/Time:  Tuesday, July 25, 2017 11:33 - CONCLUSION:  1. Minimal 





 interstitial changes in the left lower lung field with no confluent 

infiltrate. 





 2. Blunting of the left costophrenic angle may represent pleural parenchymal 





 scarring or a small effusion. 3. Old granulomatous disease. Heart size is 





 normal.     Domingo Hahn MD 


 


Head CT 7/22/17 2027 Signed





Impressions: 





 Service Date/Time:  Saturday, July 22, 2017 20:39 - CONCLUSION:  Cerebral 





 atrophy and chronic ischemic small vessel vasculopathy.     Giorgio Lechuga MD 








Laboratory











 Date/Time Procedure Status





Source Growth 


 


 7/22/17 22:09 Urine Culture - Final Complete





Urine Random Urine Proteus Mirabilis 


 


 7/22/17 21:38 Aerobic Blood Culture - Final Complete





Blood Peripheral NO GROWTH IN 5 DAYS 





 7/22/17 21:38 Anaerobic Blood Culture - Final Complete





Blood Peripheral NO GROWTH IN 5 DAYS 








Physical Examination


HEENT: PERRL; normocephalic; atraumatic; no jaundice.   


CHEST:  CTA


CARDIAC:  RRR


ABDOMEN:  Soft, nondistended, nontender; no hepatosplenomegaly; bowel sounds 

are present in all four quadrants.


EXTREMITIES: No clubbing, cyanosis, or edema.


SKIN:  Normal; no rash; no jaundice.


CNS:  alert (Marion Olea ARNP)





Assessment and Plan


Plan


ASSESSMENT


- dysphagia - probable stricture.  hx strictures, repeated EGD & esophageal 

dilatation. hx aspiration PNA after MBS in 11/2016.  failed initial  swallow 

eval this admission


     and 2nd one better with recommendation for soft diet and thin liquids.  

MBS did not show aspiration. Seems to have difficulty swallowing secretions.  

will need to stop pradaxa





PLAN


- EGD with dilatation monday


- obtain consents


- NPO after midnight sunday


- hold pradaxa


- soft diet


- supportive care


- This pt seen by myself and Dr Perez and this note was written on his 

behalf  (Marion Olea)


Physician Comments


Patient seen and examined


Agree with above


Continue with current supportive care


Monitor labs


We'll need to stop the Pradaxa and we'll plan on an EGD with dilation to be 

done on Monday (Heraclio Perez MD)








Marion Olea Jul 27, 2017 17:03


Heraclio Perez MD Jul 27, 2017 21:13

## 2017-07-27 NOTE — HHI.PR
Subjective


Remarks


eager for d/c. wife want gi eval





Objective


Vitals


heart reg


lung course bs


abd s/nt/bs


ext no edema


 Vital Signs








  Date Time  Temp Pulse Resp B/P Pulse Ox O2 Delivery O2 Flow Rate FiO2


 


7/27/17 08:21 97.8 90 18 133/88 95   


 


7/27/17 04:00 98.1 88 17 148/90 93   


 


7/27/17 00:00 98.2 89 18 143/80 96   


 


7/26/17 21:00  65      


 


7/26/17 20:00 97.9 89 18 115/76 96   


 


7/26/17 16:15 97.9 81 18 137/80 94   


 


7/26/17 12:16 98.2 85 18 125/80 96   














 7/26/17 7/26/17 7/27/17





 14:59 22:59 06:59


 


Output Total 100 ml 300 ml 


 


Balance -100 ml -300 ml 


 


   


 


Output Urine Total 100 ml 300 ml 


 


# Voids   2


 


# Bowel Movements  0 0








Result Diagram:  


7/24/17 0706                                                                   

             7/24/17 0706





Imaging





Last Impressions








Head CT 7/22/17 2027 Signed





Impressions: 





 Service Date/Time:  Saturday, July 22, 2017 20:39 - CONCLUSION:  Cerebral 





 atrophy and chronic ischemic small vessel vasculopathy.     Giorgio Lechuga MD 


 


Chest X-Ray 7/22/17 2027 Signed





Impressions: 





 Service Date/Time:  Saturday, July 22, 2017 20:26 - CONCLUSION: No acute 





 disease.       Giorgio Lechuga MD 











A/P


Problem List:  


(1) Dysphagia


Status:  Acute


Plan:  Pt has hx aspiration, esophageal stricture and dilation


now with alot of post pharynx secretions. cough and aspiration.





speech and barium swallow noted


will ask his GI doctor to evaluate as he seems to have trouble


  handling his own secretion even w/out eating..high risk of aspiration.


wife says this is how he presented last time with obstruction in esophagus.


?trial levsin





(2) UTI (urinary tract infection)


Status:  Acute


Plan:  


- Patient started on Rocephin


- Urine culture growing out Proteus Mirabilis with sensitivity to Cipro


- Antibiotics changed to Cipro on 7/24





(3) Esophageal stricture


Status:  Chronic


Plan:  - s/p dilation


- PPI





(4) Generalized weakness


Status:  Acute


Plan:  


- Likely exacerbated by patient's urinary tract infection


- PT recommending rehab at SNF





(5) Atrial fibrillation


Status:  Chronic


Plan:  


- Atenolol, pradaxa





(6) HTN (hypertension)


Status:  Chronic


Plan:  


- stable


- metoprolol





(7) Dementia


Status:  Chronic


Plan:  


- Patient follows with Dr. Yanez


- Patient instructed by  been awake to hold his galantamine for a week to 

see if that was causing his fatigue


- Namenda





(8) Diabetes mellitus type 2, diet-controlled


Status:  Chronic


Plan:  


- Diet controlled








Problem Qualifiers





(1) UTI (urinary tract infection):  


Qualified Code:  N30.01 - Acute cystitis with hematuria


(2) Atrial fibrillation:  


Qualified Code:  I48.2 - Chronic atrial fibrillation


(3) HTN (hypertension):  


Qualified Code:  I10 - Essential hypertension





Tan Gutierrez MD Jul 27, 2017 11:06

## 2017-07-28 VITALS
TEMPERATURE: 97.9 F | SYSTOLIC BLOOD PRESSURE: 121 MMHG | OXYGEN SATURATION: 94 % | RESPIRATION RATE: 18 BRPM | HEART RATE: 95 BPM | DIASTOLIC BLOOD PRESSURE: 78 MMHG

## 2017-07-28 VITALS
DIASTOLIC BLOOD PRESSURE: 68 MMHG | HEART RATE: 73 BPM | SYSTOLIC BLOOD PRESSURE: 126 MMHG | RESPIRATION RATE: 20 BRPM | TEMPERATURE: 97.4 F | OXYGEN SATURATION: 93 %

## 2017-07-28 VITALS
DIASTOLIC BLOOD PRESSURE: 77 MMHG | HEART RATE: 88 BPM | SYSTOLIC BLOOD PRESSURE: 116 MMHG | TEMPERATURE: 97.7 F | RESPIRATION RATE: 18 BRPM | OXYGEN SATURATION: 96 %

## 2017-07-28 VITALS
SYSTOLIC BLOOD PRESSURE: 115 MMHG | TEMPERATURE: 97.9 F | RESPIRATION RATE: 16 BRPM | OXYGEN SATURATION: 96 % | HEART RATE: 88 BPM | DIASTOLIC BLOOD PRESSURE: 79 MMHG

## 2017-07-28 VITALS
RESPIRATION RATE: 18 BRPM | DIASTOLIC BLOOD PRESSURE: 82 MMHG | TEMPERATURE: 96.9 F | SYSTOLIC BLOOD PRESSURE: 130 MMHG | HEART RATE: 104 BPM | OXYGEN SATURATION: 94 %

## 2017-07-28 VITALS
DIASTOLIC BLOOD PRESSURE: 71 MMHG | OXYGEN SATURATION: 96 % | SYSTOLIC BLOOD PRESSURE: 114 MMHG | TEMPERATURE: 97.7 F | HEART RATE: 88 BPM | RESPIRATION RATE: 16 BRPM

## 2017-07-28 VITALS — HEART RATE: 91 BPM

## 2017-07-28 VITALS — HEART RATE: 88 BPM

## 2017-07-28 RX ADMIN — ATENOLOL SCH MG: 25 TABLET ORAL at 08:40

## 2017-07-28 RX ADMIN — Medication SCH ML: at 21:00

## 2017-07-28 RX ADMIN — PHENAZOPYRIDINE SCH MG: 100 TABLET ORAL at 16:36

## 2017-07-28 RX ADMIN — PANTOPRAZOLE SCH MG: 40 TABLET, DELAYED RELEASE ORAL at 21:51

## 2017-07-28 RX ADMIN — LORATADINE SCH MG: 10 TABLET ORAL at 08:41

## 2017-07-28 RX ADMIN — PHENAZOPYRIDINE SCH MG: 100 TABLET ORAL at 08:41

## 2017-07-28 RX ADMIN — MEMANTINE HYDROCHLORIDE SCH MG: 10 TABLET ORAL at 21:51

## 2017-07-28 RX ADMIN — Medication SCH ML: at 08:42

## 2017-07-28 RX ADMIN — CIPROFLOXACIN HYDROCHLORIDE SCH MG: 500 TABLET, FILM COATED ORAL at 08:41

## 2017-07-28 RX ADMIN — ATORVASTATIN CALCIUM SCH MG: 40 TABLET, FILM COATED ORAL at 21:51

## 2017-07-28 RX ADMIN — MEMANTINE HYDROCHLORIDE SCH MG: 10 TABLET ORAL at 08:41

## 2017-07-28 RX ADMIN — CIPROFLOXACIN HYDROCHLORIDE SCH MG: 500 TABLET, FILM COATED ORAL at 21:51

## 2017-07-28 RX ADMIN — PANTOPRAZOLE SCH MG: 40 TABLET, DELAYED RELEASE ORAL at 08:41

## 2017-07-28 RX ADMIN — VITAMIN D, TAB 1000IU (100/BT) SCH UNITS: 25 TAB at 08:40

## 2017-07-28 RX ADMIN — ACETAMINOPHEN PRN MG: 325 TABLET ORAL at 10:41

## 2017-07-28 NOTE — RADRPT
EXAM DATE/TIME:  07/28/2017 14:09 

 

[facility] COMPARISON:     

No previous studies available for comparison.

 

                     

INDICATIONS :     

Left hip pain.

                     

 

MEDICAL HISTORY :     

Hypertension.  Arthritis.  Myocardial infarction.      

 

SURGICAL HISTORY :     

Total knee replacement, left.   

 

ENCOUNTER:     

Initial                                        

 

ACUITY:     

1 week      

 

PAIN SCORE:     

4/10

 

LOCATION:     

Left  knee, in the joint.

 

FINDINGS:     

No definite fractures, or dislocations are identified.  No definite lytic or sclerotic lesion is seen
.  Slight osteopenia is seen. The joint spaces are well maintained. Chronic atherosclerotic calcifica
tions are seen involving the visualized arteries.

 

CONCLUSION:          Slight osteopenia.

 

 

 

 DDULEY Hartmann MD on July 28, 2017 at 14:48           

Board Certified Radiologist.

 This report was verified electronically.

## 2017-07-28 NOTE — HHI.GIFU
Subjective


Remarks


Per wife pt eating better today.  Pt feels fine.   (Marion Olea ARNMADELINE)





Objective


Vitals I&O





 Vital Signs








  Date Time  Temp Pulse Resp B/P Pulse Ox O2 Delivery O2 Flow Rate FiO2


 


7/28/17 15:52 96.9 104 18 130/82 94   


 


7/28/17 12:00 97.7 88 16 114/71 96   


 


7/28/17 08:00 97.7 88 18 116/77 96   


 


7/28/17 07:00  91      


 


7/28/17 04:00 97.9 88 16 115/79 96   


 


7/28/17 00:04 97.9 95 18 121/78 94   


 


7/27/17 23:18  88      


 


7/27/17 21:27 97.7 88 18 135/76 93   








 I/O








 7/27/17 7/27/17 7/27/17 7/28/17 7/28/17 7/28/17





 07:00 15:00 23:00 07:00 15:00 23:00


 


Intake Total  240 ml   480 ml 


 


Output Total    150 ml 500 ml 


 


Balance  240 ml  -150 ml -20 ml 


 


      


 


Intake Oral  240 ml   480 ml 


 


Output Urine Total    150 ml 500 ml 


 


# Voids 2 3  1  


 


# Bowel Movements 0 1  0 0 








Imaging





Last Impressions








Knee X-Ray 7/28/17 0000 Signed





Impressions: 





 Service Date/Time:  Friday, July 28, 2017 14:12 - CONCLUSION: Joint effusion, 





 chronic changes and no definite fracture.     DUDLEY Hartmann MD 


 


Hip and Pelvis X-Ray 7/28/17 0000 Signed





Impressions: 





 Service Date/Time:  Friday, July 28, 2017 14:09 - CONCLUSION: Slight 

osteopenia. 





     DUDLEY Hartmann MD 


 


Modified Barium Swallow 7/26/17 1553 Signed





Impressions: 





 Service Date/Time:  Wednesday, July 26, 2017 15:53 - CONCLUSION:   1.  No 





 penetration of the supraglottic larynx or tracheal aspiration.   2.  Minimal 





 pooling within the paired vallecula and piriform sinuses.     Branden Shipley MD 


 


Chest X-Ray 7/25/17 0000 Signed





Impressions: 





 Service Date/Time:  Tuesday, July 25, 2017 11:33 - CONCLUSION:  1. Minimal 





 interstitial changes in the left lower lung field with no confluent 

infiltrate. 





 2. Blunting of the left costophrenic angle may represent pleural parenchymal 





 scarring or a small effusion. 3. Old granulomatous disease. Heart size is 





 normal.     Domingo Hahn MD 


 


Head CT 7/22/17 2027 Signed





Impressions: 





 Service Date/Time:  Saturday, July 22, 2017 20:39 - CONCLUSION:  Cerebral 





 atrophy and chronic ischemic small vessel vasculopathy.     Giorgio Lechuga MD 








Physical Exam


HEENT: PERRL; normocephalic; atraumatic; no jaundice.   


CHEST:  CTA


CARDIAC:  RRR


ABDOMEN:  Soft, nondistended, nontender; no hepatosplenomegaly; bowel sounds 

are present in all four quadrants.


EXTREMITIES: No clubbing, cyanosis, left knee swollen


SKIN:  Normal; no rash; no jaundice.


CNS: alert (Marion Olea)





Assessment and Plan


Plan


ASSESSMENT


- dysphagia - probable stricture.  hx strictures, repeated EGD & esophageal 

dilatation. hx aspiration PNA after MBS in 11/2016.  failed initial  swallow 

eval this admission


     and 2nd one better with recommendation for soft diet and thin liquids.  

MBS did not show aspiration. Seems to have difficulty swallowing secretions.  

pradaxa held





PLAN


- EGD with dilatation monday


- NPO after midnight sunday


- holding pradaxa


- soft diet


- supportive care


- This pt seen by myself and Dr Perez and this note was written on his 

behalf  (Marion Olea)


Physician Comments


Patient seen and examined


Agree with above


Continue with current supportive care


Monitor labs (Heraclio Perez MD)








Marion Olea Jul 28, 2017 16:43


Heraclio Perez MD Jul 28, 2017 22:18

## 2017-07-28 NOTE — RADRPT
EXAM DATE/TIME:  07/28/2017 14:12 

 

[facility] COMPARISON:     

No previous studies available for comparison.

 

                     

INDICATIONS :     

Left knee pain.

                     

 

MEDICAL HISTORY :     

Hypertension.  Arthritis.  Myocardial infarction.      

 

SURGICAL HISTORY :     

Total knee replacement, left.   

 

ENCOUNTER:     

Initial                                        

 

ACUITY:     

1 week      

 

PAIN SCORE:     

1/10

LOCATION:     Left  knee.

 

FINDINGS:     

No definite fractures, or dislocations are identified.  No definite lytic or sclerotic lesion is seen
.  Slight osteopenia is seen. There is chondrocalcinosis and slight tricompartment osteoarthritis. La
rge joint effusion is seen. Chronic atherosclerotic calcifications are seen involving the visualized 
arteries.

 

CONCLUSION: Joint effusion, chronic changes and no definite fracture.

 

 

 

 DUDLEY Hartmann MD on July 28, 2017 at 14:49           

Board Certified Radiologist.

 This report was verified electronically.

## 2017-07-28 NOTE — HHI.PR
Subjective


Remarks


left knee swollen and left hip pain.





Objective


Vitals


haert reg


lung cta


abd s/nt


ext left knee effusion


no left hip tenderness. FROM ok.





 Vital Signs








  Date Time  Temp Pulse Resp B/P Pulse Ox O2 Delivery O2 Flow Rate FiO2


 


7/28/17 12:00 97.7 88 16 114/71 96   


 


7/28/17 08:00 97.7 88 18 116/77 96   


 


7/28/17 07:00  91      


 


7/28/17 04:00 97.9 88 16 115/79 96   


 


7/28/17 00:04 97.9 95 18 121/78 94   


 


7/27/17 23:18  88      


 


7/27/17 21:27 97.7 88 18 135/76 93   


 


7/27/17 16:25 97.5 79 20 117/73 94   














 7/27/17 7/27/17 7/28/17





 14:59 22:59 06:59


 


Intake Total 240 ml  


 


Output Total   150 ml


 


Balance 240 ml  -150 ml


 


   


 


Intake Oral 240 ml  


 


Output Urine Total   150 ml


 


# Voids 3  1


 


# Bowel Movements 1  0








Result Diagram:  


7/24/17 0706                                                                   

             7/24/17 0706





Imaging





Last Impressions








Head CT 7/22/17 2027 Signed





Impressions: 





 Service Date/Time:  Saturday, July 22, 2017 20:39 - CONCLUSION:  Cerebral 





 atrophy and chronic ischemic small vessel vasculopathy.     Giorgio Lechuga MD 


 


Chest X-Ray 7/22/17 2027 Signed





Impressions: 





 Service Date/Time:  Saturday, July 22, 2017 20:26 - CONCLUSION: No acute 





 disease.       Giorgio Lechuga MD 











A/P


Problem List:  


(1) Dysphagia


Status:  Acute


Plan:  Pt has hx aspiration, esophageal stricture and dilation


now with alot of post pharynx secretions. cough and aspiration.





speech and barium swallow noted


will ask his GI doctor to evaluate as he seems to have trouble


  handling his own secretion even w/out eating..high risk of aspiration.


wife says this is how he presented last time with obstruction in esophagus.


?trial levsin


hold anticoagulation and plan for egd/dilation Monday.





(2) Knee effusion, left


Status:  Acute


Plan:  Pt c/o left knee effusion


probably reactive from OA


steroid trial


xray left knee and hip for c/o pain





(3) UTI (urinary tract infection)


Status:  Acute


Plan:  


- Patient started on Rocephin


- Urine culture growing out Proteus Mirabilis with sensitivity to Cipro


- Antibiotics changed to Cipro on 7/24





(4) Esophageal stricture


Status:  Chronic


Plan:  - s/p dilation


- PPI





(5) Generalized weakness


Status:  Acute


Plan:  


- Likely exacerbated by patient's urinary tract infection


- PT recommending rehab at SNF





(6) Atrial fibrillation


Status:  Chronic


Plan:  


- Atenolol, pradaxa





(7) HTN (hypertension)


Status:  Chronic


Plan:  


- stable


- metoprolol





(8) Dementia


Status:  Chronic


Plan:  


- Patient follows with Dr. Yanez


- Patient instructed by  been awake to hold his galantamine for a week to 

see if that was causing his fatigue


- Namenda





(9) Diabetes mellitus type 2, diet-controlled


Status:  Chronic


Plan:  


- Diet controlled








Problem Qualifiers





(1) UTI (urinary tract infection):  


Qualified Code:  N30.01 - Acute cystitis with hematuria


(2) Atrial fibrillation:  


Qualified Code:  I48.2 - Chronic atrial fibrillation


(3) HTN (hypertension):  


Qualified Code:  I10 - Essential hypertension





Tan Gutierrez MD Jul 28, 2017 14:20

## 2017-07-29 VITALS
HEART RATE: 89 BPM | OXYGEN SATURATION: 93 % | SYSTOLIC BLOOD PRESSURE: 102 MMHG | RESPIRATION RATE: 22 BRPM | DIASTOLIC BLOOD PRESSURE: 65 MMHG | TEMPERATURE: 97.5 F

## 2017-07-29 VITALS
OXYGEN SATURATION: 96 % | RESPIRATION RATE: 20 BRPM | DIASTOLIC BLOOD PRESSURE: 67 MMHG | TEMPERATURE: 97.6 F | SYSTOLIC BLOOD PRESSURE: 107 MMHG | HEART RATE: 93 BPM

## 2017-07-29 VITALS
RESPIRATION RATE: 19 BRPM | DIASTOLIC BLOOD PRESSURE: 80 MMHG | OXYGEN SATURATION: 92 % | TEMPERATURE: 98.1 F | HEART RATE: 90 BPM | SYSTOLIC BLOOD PRESSURE: 125 MMHG

## 2017-07-29 VITALS — HEART RATE: 91 BPM

## 2017-07-29 VITALS
HEART RATE: 81 BPM | OXYGEN SATURATION: 94 % | SYSTOLIC BLOOD PRESSURE: 110 MMHG | TEMPERATURE: 97.7 F | RESPIRATION RATE: 17 BRPM | DIASTOLIC BLOOD PRESSURE: 65 MMHG

## 2017-07-29 VITALS
OXYGEN SATURATION: 95 % | RESPIRATION RATE: 20 BRPM | HEART RATE: 99 BPM | TEMPERATURE: 97.6 F | DIASTOLIC BLOOD PRESSURE: 69 MMHG | SYSTOLIC BLOOD PRESSURE: 117 MMHG

## 2017-07-29 VITALS
HEART RATE: 92 BPM | TEMPERATURE: 97.8 F | SYSTOLIC BLOOD PRESSURE: 97 MMHG | RESPIRATION RATE: 20 BRPM | DIASTOLIC BLOOD PRESSURE: 65 MMHG | OXYGEN SATURATION: 95 %

## 2017-07-29 RX ADMIN — Medication SCH ML: at 22:50

## 2017-07-29 RX ADMIN — PHENAZOPYRIDINE SCH MG: 100 TABLET ORAL at 01:49

## 2017-07-29 RX ADMIN — CIPROFLOXACIN HYDROCHLORIDE SCH MG: 500 TABLET, FILM COATED ORAL at 22:49

## 2017-07-29 RX ADMIN — PHENAZOPYRIDINE SCH MG: 100 TABLET ORAL at 22:49

## 2017-07-29 RX ADMIN — LORATADINE SCH MG: 10 TABLET ORAL at 09:32

## 2017-07-29 RX ADMIN — CIPROFLOXACIN HYDROCHLORIDE SCH MG: 500 TABLET, FILM COATED ORAL at 09:32

## 2017-07-29 RX ADMIN — PHENAZOPYRIDINE SCH MG: 100 TABLET ORAL at 09:32

## 2017-07-29 RX ADMIN — MEMANTINE HYDROCHLORIDE SCH MG: 10 TABLET ORAL at 22:49

## 2017-07-29 RX ADMIN — Medication SCH ML: at 09:33

## 2017-07-29 RX ADMIN — MEMANTINE HYDROCHLORIDE SCH MG: 10 TABLET ORAL at 09:32

## 2017-07-29 RX ADMIN — VITAMIN D, TAB 1000IU (100/BT) SCH UNITS: 25 TAB at 09:32

## 2017-07-29 RX ADMIN — PANTOPRAZOLE SCH MG: 40 TABLET, DELAYED RELEASE ORAL at 09:32

## 2017-07-29 RX ADMIN — ATORVASTATIN CALCIUM SCH MG: 40 TABLET, FILM COATED ORAL at 22:49

## 2017-07-29 RX ADMIN — PANTOPRAZOLE SCH MG: 40 TABLET, DELAYED RELEASE ORAL at 22:49

## 2017-07-29 RX ADMIN — ATENOLOL SCH MG: 25 TABLET ORAL at 09:33

## 2017-07-29 RX ADMIN — PHENAZOPYRIDINE SCH MG: 100 TABLET ORAL at 17:16

## 2017-07-29 NOTE — HHI.PR
Subjective


Remarks


left knee and hip feel better


wife says he was oob last night.


left knee less swollen





Objective


Vitals


heart reg


lung cta


abd s/nt


ext left knee effusion smaller


 able to actively flex knee and hip


 on his own without any sig. pain.


knee not hot or red.


 Vital Signs








  Date Time  Temp Pulse Resp B/P Pulse Ox O2 Delivery O2 Flow Rate FiO2


 


7/29/17 08:00 97.6 99 20 117/69 95   


 


7/29/17 04:00 97.8 92 20 97/65 95   


 


7/29/17 00:00 98.1 90 19 125/80 92   


 


7/28/17 21:51  88      


 


7/28/17 20:00 97.4 73 20 126/68 93   


 


7/28/17 15:52 96.9 104 18 130/82 94   


 


7/28/17 12:00 97.7 88 16 114/71 96   














 7/28/17 7/28/17 7/29/17





 14:59 22:59 06:59


 


Intake Total 480 ml 240 ml 


 


Output Total 500 ml  


 


Balance -20 ml 240 ml 


 


   


 


Intake Oral 480 ml 240 ml 


 


Output Urine Total 500 ml  


 


# Voids  1 1


 


# Bowel Movements 0  2








Imaging





Last Impressions








Head CT 7/22/17 2027 Signed





Impressions: 





 Service Date/Time:  Saturday, July 22, 2017 20:39 - CONCLUSION:  Cerebral 





 atrophy and chronic ischemic small vessel vasculopathy.     Giorgio Lechuga MD 


 


Chest X-Ray 7/22/17 2027 Signed





Impressions: 





 Service Date/Time:  Saturday, July 22, 2017 20:26 - CONCLUSION: No acute 





 disease.       Giorgio Lechuga MD 











A/P


Problem List:  


(1) Dysphagia


Status:  Acute


Plan:  Pt has hx aspiration, esophageal stricture and dilation


now with alot of post pharynx secretions. cough and aspiration.





speech and barium swallow noted


 GI doctor to evaluate as he seems to have trouble


  handling his own secretion even w/out eating..high risk of aspiration.


wife says this is how he presented last time with obstruction in esophagus.


?trial levsin


hold anticoagulation and plan for egd/dilation Monday.





(2) Knee effusion, left


Status:  Acute


Plan:  Pt c/o left knee effusion


probably reactive from OA but given chondocalcinosis


 on xray it could be pseudogout


steroid trial


today the left knee effusion smaller and no pain left hip.


cont current rx...if not completely better consider 


colchicine or naprosyn





(3) UTI (urinary tract infection)


Status:  Acute


Plan:  


- Patient started on Rocephin


- Urine culture growing out Proteus Mirabilis with sensitivity to Cipro


- Antibiotics changed to Cipro on 7/24





(4) Esophageal stricture


Status:  Chronic


Plan:  - s/p dilation


- PPI





(5) Generalized weakness


Status:  Acute


Plan:  


- Likely exacerbated by patient's urinary tract infection


- PT recommending rehab at SNF





(6) Atrial fibrillation


Status:  Chronic


Plan:  


- Atenolol, pradaxa





(7) HTN (hypertension)


Status:  Chronic


Plan:  


- stable


- metoprolol





(8) Dementia


Status:  Chronic


Plan:  


- Patient follows with Dr. Yanez


- Patient instructed by  been awake to hold his galantamine for a week to 

see if that was causing his fatigue


- Namenda





(9) Diabetes mellitus type 2, diet-controlled


Status:  Chronic


Plan:  


- Diet controlled








Problem Qualifiers





(1) UTI (urinary tract infection):  


Qualified Code:  N30.01 - Acute cystitis with hematuria


(2) Atrial fibrillation:  


Qualified Code:  I48.2 - Chronic atrial fibrillation


(3) HTN (hypertension):  


Qualified Code:  I10 - Essential hypertension





Tan Gutierrez MD Jul 29, 2017 10:08

## 2017-07-30 VITALS
HEART RATE: 81 BPM | OXYGEN SATURATION: 93 % | TEMPERATURE: 97.7 F | RESPIRATION RATE: 18 BRPM | SYSTOLIC BLOOD PRESSURE: 125 MMHG | DIASTOLIC BLOOD PRESSURE: 59 MMHG

## 2017-07-30 VITALS
SYSTOLIC BLOOD PRESSURE: 121 MMHG | HEART RATE: 97 BPM | OXYGEN SATURATION: 91 % | DIASTOLIC BLOOD PRESSURE: 79 MMHG | RESPIRATION RATE: 19 BRPM | TEMPERATURE: 97.4 F

## 2017-07-30 VITALS
SYSTOLIC BLOOD PRESSURE: 121 MMHG | RESPIRATION RATE: 19 BRPM | HEART RATE: 85 BPM | OXYGEN SATURATION: 95 % | TEMPERATURE: 97.8 F | DIASTOLIC BLOOD PRESSURE: 71 MMHG

## 2017-07-30 VITALS
OXYGEN SATURATION: 94 % | HEART RATE: 90 BPM | SYSTOLIC BLOOD PRESSURE: 122 MMHG | RESPIRATION RATE: 18 BRPM | DIASTOLIC BLOOD PRESSURE: 67 MMHG | TEMPERATURE: 97.7 F

## 2017-07-30 VITALS
DIASTOLIC BLOOD PRESSURE: 68 MMHG | RESPIRATION RATE: 18 BRPM | OXYGEN SATURATION: 93 % | TEMPERATURE: 97.4 F | HEART RATE: 96 BPM | SYSTOLIC BLOOD PRESSURE: 139 MMHG

## 2017-07-30 VITALS
TEMPERATURE: 97.2 F | OXYGEN SATURATION: 91 % | DIASTOLIC BLOOD PRESSURE: 62 MMHG | RESPIRATION RATE: 19 BRPM | HEART RATE: 79 BPM | SYSTOLIC BLOOD PRESSURE: 114 MMHG

## 2017-07-30 RX ADMIN — LORATADINE SCH MG: 10 TABLET ORAL at 08:39

## 2017-07-30 RX ADMIN — MEMANTINE HYDROCHLORIDE SCH MG: 10 TABLET ORAL at 21:18

## 2017-07-30 RX ADMIN — Medication SCH ML: at 21:25

## 2017-07-30 RX ADMIN — ATENOLOL SCH MG: 25 TABLET ORAL at 08:39

## 2017-07-30 RX ADMIN — SIMETHICONE SCH MG: 125 TABLET, CHEWABLE ORAL at 21:18

## 2017-07-30 RX ADMIN — MEMANTINE HYDROCHLORIDE SCH MG: 10 TABLET ORAL at 08:39

## 2017-07-30 RX ADMIN — ATORVASTATIN CALCIUM SCH MG: 40 TABLET, FILM COATED ORAL at 21:17

## 2017-07-30 RX ADMIN — CIPROFLOXACIN HYDROCHLORIDE SCH MG: 500 TABLET, FILM COATED ORAL at 21:17

## 2017-07-30 RX ADMIN — PANTOPRAZOLE SCH MG: 40 TABLET, DELAYED RELEASE ORAL at 21:18

## 2017-07-30 RX ADMIN — Medication SCH ML: at 08:40

## 2017-07-30 RX ADMIN — VITAMIN D, TAB 1000IU (100/BT) SCH UNITS: 25 TAB at 08:39

## 2017-07-30 RX ADMIN — CIPROFLOXACIN HYDROCHLORIDE SCH MG: 500 TABLET, FILM COATED ORAL at 08:39

## 2017-07-30 RX ADMIN — PHENAZOPYRIDINE SCH MG: 100 TABLET ORAL at 08:39

## 2017-07-30 RX ADMIN — PANTOPRAZOLE SCH MG: 40 TABLET, DELAYED RELEASE ORAL at 08:39

## 2017-07-30 RX ADMIN — SIMETHICONE SCH MG: 125 TABLET, CHEWABLE ORAL at 13:02

## 2017-07-30 NOTE — HHI.GIFU
Subjective


Remarks


Sitting up in bed. Wife and daughter at bedside. Reports episode of hiccups 

today. Denies abdominal pain. No nausea or vomiting.  (Saira Barrera)





Objective


Vitals I&O





 Vital Signs








  Date Time  Temp Pulse Resp B/P Pulse Ox O2 Delivery O2 Flow Rate FiO2


 


7/30/17 12:00 97.8 85 19 121/71 95   


 


7/30/17 08:00 97.4 97 19 121/79 91   


 


7/30/17 06:32 97.4 96 18 139/68 93   


 


7/30/17 00:00 97.7 90 18 122/67 94   


 


7/29/17 20:00 97.5 89 22 102/65 93   


 


7/29/17 18:00  91      


 


7/29/17 16:00 97.7 81 20 113/74 94   








 I/O








 7/29/17 7/29/17 7/29/17 7/30/17 7/30/17 7/30/17





 06:59 14:59 22:59 06:59 14:59 22:59


 


Intake Total  480 ml    


 


Output Total   20 ml   


 


Balance  480 ml -20 ml   


 


      


 


Intake Oral  480 ml    


 


Output Urine Total   20 ml   


 


# Voids 1 3  2  


 


# Bowel Movements 2 0 0 1  








Imaging





Last Impressions








Knee X-Ray 7/28/17 0000 Signed





Impressions: 





 Service Date/Time:  Friday, July 28, 2017 14:12 - CONCLUSION: Joint effusion, 





 chronic changes and no definite fracture.     DUDLEY Hartmann MD 


 


Hip and Pelvis X-Ray 7/28/17 0000 Signed





Impressions: 





 Service Date/Time:  Friday, July 28, 2017 14:09 - CONCLUSION: Slight 

osteopenia. 





     DUDLEY Hartmann MD 


 


Modified Barium Swallow 7/26/17 1553 Signed





Impressions: 





 Service Date/Time:  Wednesday, July 26, 2017 15:53 - CONCLUSION:   1.  No 





 penetration of the supraglottic larynx or tracheal aspiration.   2.  Minimal 





 pooling within the paired vallecula and piriform sinuses.     Branden Shipley MD 


 


Chest X-Ray 7/25/17 0000 Signed





Impressions: 





 Service Date/Time:  Tuesday, July 25, 2017 11:33 - CONCLUSION:  1. Minimal 





 interstitial changes in the left lower lung field with no confluent 

infiltrate. 





 2. Blunting of the left costophrenic angle may represent pleural parenchymal 





 scarring or a small effusion. 3. Old granulomatous disease. Heart size is 





 normal.     Domingo Hahn MD 


 


Head CT 7/22/17 2027 Signed





Impressions: 





 Service Date/Time:  Saturday, July 22, 2017 20:39 - CONCLUSION:  Cerebral 





 atrophy and chronic ischemic small vessel vasculopathy.     Giorgio Lechuga MD 








Physical Exam


HEENT: PERRLA; normocephalic; atraumatic; no jaundice.   


CHEST:  CTA


CARDIAC:  RRR


ABDOMEN:  Soft, nondistended, nontender; no hepatosplenomegaly; bowel sounds x 

4.


EXTREMITIES: No clubbing, cyanosis, left knee swollen


SKIN:  Normal; no rash; no jaundice.


CNS: Alert. (Saira Barrera)





Assessment and Plan


Plan


ASSESSMENT


Dysphagia with probable stricture. Patient with history of strictures, multiple 

EGDs & esophageal dilatations. History of 


 aspiration PNA after MBS in 11/2016.  Failed initial swallow eval this 

admission and 2nd one better with recommendation 


 for soft diet and thin liquids. MBS did not show aspiration. Seems to have 

difficulty swallowing secretions. Pradaxa on hold.





PLAN


- EGD with dilatation Monday


- Obtain consents


- NPO tonight at midnight


- Hold Pradaxa


- Soft diet


- Supportive care.


- Further recommendations to follow based on results of above





Patient seen and examined by Dr. Perez and myself and this note is written 

on his behalf.  (Saira Barrera)


Physician Comments


Patient seen and examined


Agree with above


Continue with current supportive care


Monitor labs


EGD tomorrow with possible dilation (Heraclio Perez MD)








Saira Barrera Jul 30, 2017 14:41


Heraclio Perez MD Jul 30, 2017 21:01

## 2017-07-30 NOTE — HHI.PR
Subjective


Remarks


some hiccups overnight


belching/burping.


ambulating ok on the hip/knee





Objective


Vitals


heart reg


lung cta


abd s/nt


ext left knee effusion smaller


 able to actively flex/extend at the


 left hip/knee w/out pain.


 Vital Signs








  Date Time  Temp Pulse Resp B/P Pulse Ox O2 Delivery O2 Flow Rate FiO2


 


7/30/17 08:00 97.4 97 19 121/79 91   


 


7/30/17 06:32 97.4 96 18 139/68 93   


 


7/30/17 00:00 97.7 90 18 122/67 94   


 


7/29/17 20:00 97.5 89 22 102/65 93   


 


7/29/17 18:00  91      


 


7/29/17 16:00 97.7 81 20 113/74 94   


 


7/29/17 12:00 97.6 93 20 107/67 96   














 7/29/17 7/29/17 7/30/17





 15:00 23:00 07:00


 


Intake Total 480 ml  


 


Output Total  20 ml 


 


Balance 480 ml -20 ml 


 


   


 


Intake Oral 480 ml  


 


Output Urine Total  20 ml 


 


# Voids 3  2


 


# Bowel Movements 0 0 1








Imaging





Last Impressions








Head CT 7/22/17 2027 Signed





Impressions: 





 Service Date/Time:  Saturday, July 22, 2017 20:39 - CONCLUSION:  Cerebral 





 atrophy and chronic ischemic small vessel vasculopathy.     Giorgio Lechuga MD 


 


Chest X-Ray 7/22/17 2027 Signed





Impressions: 





 Service Date/Time:  Saturday, July 22, 2017 20:26 - CONCLUSION: No acute 





 disease.       Giorgio Lechuga MD 











A/P


Problem List:  


(1) Dysphagia


Status:  Acute


Plan:  Pt has hx aspiration, esophageal stricture and dilation


now with alot of post pharynx secretions. cough and aspiration.





speech and barium swallow noted


 GI doctor to evaluate as he seems to have trouble


  handling his own secretion even w/out eating..high risk of aspiration.


wife says this is how he presented last time with obstruction in esophagus.


?trial levsin


hold anticoagulation and plan for egd/dilation Monday.





(2) Knee effusion, left


Status:  Acute


Plan:  Pt c/o left knee effusion


probably reactive from OA but given chondocalcinosis


 on xray it could be pseudogout


steroid trial


today the left knee effusion smaller and no pain left hip.


cont current rx...if not completely better consider 


colchicine or naprosyn





(3) UTI (urinary tract infection)


Status:  Acute


Plan:  


- Patient started on Rocephin


- Urine culture growing out Proteus Mirabilis with sensitivity to Cipro


- Antibiotics changed to Cipro on 7/24





(4) Esophageal stricture


Status:  Chronic


Plan:  - s/p dilation


- PPI





(5) Generalized weakness


Status:  Acute


Plan:  


- Likely exacerbated by patient's urinary tract infection


- PT recommending rehab at SNF





(6) Atrial fibrillation


Status:  Chronic


Plan:  


- Atenolol, pradaxa





(7) HTN (hypertension)


Status:  Chronic


Plan:  


- stable


- metoprolol





(8) Dementia


Status:  Chronic


Plan:  


- Patient follows with Dr. Yanez


- Patient instructed by  been awake to hold his galantamine for a week to 

see if that was causing his fatigue


- Namenda





(9) Diabetes mellitus type 2, diet-controlled


Status:  Chronic


Plan:  


- Diet controlled








Problem Qualifiers





(1) UTI (urinary tract infection):  


Qualified Code:  N30.01 - Acute cystitis with hematuria


(2) Atrial fibrillation:  


Qualified Code:  I48.2 - Chronic atrial fibrillation


(3) HTN (hypertension):  


Qualified Code:  I10 - Essential hypertension





Tan Gutierrez MD Jul 30, 2017 09:02

## 2017-07-31 VITALS
DIASTOLIC BLOOD PRESSURE: 80 MMHG | SYSTOLIC BLOOD PRESSURE: 122 MMHG | OXYGEN SATURATION: 95 % | HEART RATE: 93 BPM | RESPIRATION RATE: 18 BRPM | TEMPERATURE: 97.6 F

## 2017-07-31 VITALS
SYSTOLIC BLOOD PRESSURE: 131 MMHG | HEART RATE: 82 BPM | DIASTOLIC BLOOD PRESSURE: 58 MMHG | TEMPERATURE: 97.9 F | OXYGEN SATURATION: 97 % | RESPIRATION RATE: 18 BRPM

## 2017-07-31 VITALS
RESPIRATION RATE: 20 BRPM | OXYGEN SATURATION: 95 % | DIASTOLIC BLOOD PRESSURE: 74 MMHG | SYSTOLIC BLOOD PRESSURE: 102 MMHG | HEART RATE: 93 BPM | TEMPERATURE: 97.4 F

## 2017-07-31 VITALS
SYSTOLIC BLOOD PRESSURE: 117 MMHG | DIASTOLIC BLOOD PRESSURE: 63 MMHG | HEART RATE: 87 BPM | RESPIRATION RATE: 20 BRPM | OXYGEN SATURATION: 95 % | TEMPERATURE: 97.3 F

## 2017-07-31 VITALS
SYSTOLIC BLOOD PRESSURE: 135 MMHG | HEART RATE: 76 BPM | OXYGEN SATURATION: 95 % | TEMPERATURE: 97.5 F | RESPIRATION RATE: 20 BRPM | DIASTOLIC BLOOD PRESSURE: 76 MMHG

## 2017-07-31 VITALS
TEMPERATURE: 97.4 F | RESPIRATION RATE: 18 BRPM | OXYGEN SATURATION: 92 % | DIASTOLIC BLOOD PRESSURE: 64 MMHG | SYSTOLIC BLOOD PRESSURE: 101 MMHG | HEART RATE: 99 BPM

## 2017-07-31 VITALS — HEART RATE: 85 BPM

## 2017-07-31 PROCEDURE — 0D748ZZ DILATION OF ESOPHAGOGASTRIC JUNCTION, VIA NATURAL OR ARTIFICIAL OPENING ENDOSCOPIC: ICD-10-PCS | Performed by: INTERNAL MEDICINE

## 2017-07-31 PROCEDURE — 0DB68ZX EXCISION OF STOMACH, VIA NATURAL OR ARTIFICIAL OPENING ENDOSCOPIC, DIAGNOSTIC: ICD-10-PCS | Performed by: INTERNAL MEDICINE

## 2017-07-31 RX ADMIN — Medication SCH ML: at 20:43

## 2017-07-31 RX ADMIN — NYSTATIN SCH ML: 100000 SUSPENSION ORAL at 18:48

## 2017-07-31 RX ADMIN — SIMETHICONE SCH MG: 125 TABLET, CHEWABLE ORAL at 20:42

## 2017-07-31 RX ADMIN — CIPROFLOXACIN HYDROCHLORIDE SCH MG: 500 TABLET, FILM COATED ORAL at 20:41

## 2017-07-31 RX ADMIN — MEMANTINE HYDROCHLORIDE SCH MG: 10 TABLET ORAL at 08:42

## 2017-07-31 RX ADMIN — CIPROFLOXACIN HYDROCHLORIDE SCH MG: 500 TABLET, FILM COATED ORAL at 08:42

## 2017-07-31 RX ADMIN — VITAMIN D, TAB 1000IU (100/BT) SCH UNITS: 25 TAB at 08:42

## 2017-07-31 RX ADMIN — ATORVASTATIN CALCIUM SCH MG: 40 TABLET, FILM COATED ORAL at 20:41

## 2017-07-31 RX ADMIN — PANTOPRAZOLE SCH MG: 40 TABLET, DELAYED RELEASE ORAL at 08:42

## 2017-07-31 RX ADMIN — ATENOLOL SCH MG: 25 TABLET ORAL at 08:42

## 2017-07-31 RX ADMIN — MEMANTINE HYDROCHLORIDE SCH MG: 10 TABLET ORAL at 20:41

## 2017-07-31 RX ADMIN — SIMETHICONE SCH MG: 125 TABLET, CHEWABLE ORAL at 13:10

## 2017-07-31 RX ADMIN — PANTOPRAZOLE SCH MG: 40 TABLET, DELAYED RELEASE ORAL at 20:42

## 2017-07-31 RX ADMIN — NYSTATIN SCH ML: 100000 SUSPENSION ORAL at 13:09

## 2017-07-31 RX ADMIN — LORATADINE SCH MG: 10 TABLET ORAL at 08:42

## 2017-07-31 RX ADMIN — NYSTATIN SCH ML: 100000 SUSPENSION ORAL at 20:42

## 2017-07-31 RX ADMIN — SIMETHICONE SCH MG: 125 TABLET, CHEWABLE ORAL at 05:47

## 2017-07-31 NOTE — HHI.PR
Subjective


Remarks


going for endoscopy





Objective


Vitals


heart reg


lung cta


abd s/nt


ext left knee effusion better.


 Vital Signs








  Date Time  Temp Pulse Resp B/P Pulse Ox O2 Delivery O2 Flow Rate FiO2


 


7/31/17 08:00 97.4 93 20 102/74 95   


 


7/31/17 04:00 97.6 93 18 122/80 95   


 


7/31/17 00:00 97.4 99 18 101/64 92   


 


7/30/17 20:00 97.7 81 18 125/59 93   


 


7/30/17 16:00 97.2 79 19 114/62 91   


 


7/30/17 12:00 97.8 85 19 121/71 95   














 7/30/17 7/30/17 7/31/17





 15:00 23:00 07:00


 


Intake Total  480 ml 


 


Output Total  0 ml 


 


Balance  480 ml 


 


   


 


Intake Oral  480 ml 


 


Output Urine Total  0 ml 


 


# Voids  5 


 


# Bowel Movements  1 








Imaging





Last Impressions








Head CT 7/22/17 2027 Signed





Impressions: 





 Service Date/Time:  Saturday, July 22, 2017 20:39 - CONCLUSION:  Cerebral 





 atrophy and chronic ischemic small vessel vasculopathy.     Giorgio Lechuga MD 


 


Chest X-Ray 7/22/17 2027 Signed





Impressions: 





 Service Date/Time:  Saturday, July 22, 2017 20:26 - CONCLUSION: No acute 





 disease.       Giorgio Lechuga MD 











A/P


Problem List:  


(1) Dysphagia


Status:  Acute


Plan:  Pt has hx aspiration, esophageal stricture and dilation


now with alot of post pharynx secretions. cough and aspiration.





speech and barium swallow noted


 GI doctor to evaluate as he seems to have trouble


  handling his own secretion even w/out eating..high risk of aspiration.


wife says this is how he presented last time with obstruction in esophagus.


?trial levsin


hold anticoagulation and plan for egd/dilation today





reeval with PT and CM and decide snf vs [facility]. family wants snf but pt may 

refuse..





(2) Knee effusion, left


Status:  Acute


Plan:  Pt c/o left knee effusion


probably reactive from OA but given chondocalcinosis


 on xray it could be pseudogout


steroid trial


today the left knee effusion smaller and no pain left hip.


stop steroid. knee better and family thinks causing hiccups


if recurrent then nsaid or colchicine.





(3) UTI (urinary tract infection)


Status:  Acute


Plan:  


- Patient started on Rocephin


- Urine culture growing out Proteus Mirabilis with sensitivity to Cipro


- Antibiotics changed to Cipro on 7/24





(4) Esophageal stricture


Status:  Chronic


Plan:  - s/p dilation


- PPI





(5) Generalized weakness


Status:  Acute


Plan:  


- Likely exacerbated by patient's urinary tract infection


- PT recommending rehab at SNF





(6) Atrial fibrillation


Status:  Chronic


Plan:  


- Atenolol, pradaxa





(7) HTN (hypertension)


Status:  Chronic


Plan:  


- stable


- metoprolol





(8) Dementia


Status:  Chronic


Plan:  


- Patient follows with Dr. Yanez


- Patient instructed by  been awake to hold his galantamine for a week to 

see if that was causing his fatigue


- Namenda





(9) Diabetes mellitus type 2, diet-controlled


Status:  Chronic


Plan:  


- Diet controlled








Problem Qualifiers





(1) UTI (urinary tract infection):  


Qualified Code:  N30.01 - Acute cystitis with hematuria


(2) Atrial fibrillation:  


Qualified Code:  I48.2 - Chronic atrial fibrillation


(3) HTN (hypertension):  


Qualified Code:  I10 - Essential hypertension





Tan Gutierrez MD Jul 31, 2017 11:08

## 2017-07-31 NOTE — HHI.DCPOC
Discharge Care Plan


Diagnosis:  


(1) Aspiration into airway


(2) UTI (urinary tract infection)


(3) Generalized weakness


(4) Knee effusion, left


(5) Atrial fibrillation


(6) Diabetes mellitus type 2, diet-controlled


(7) HTN (hypertension)


Goals to Promote Your Health


* To prevent worsening of your condition and complications


* To maintain your health at the optimal level


Directions to Meet Your Goals


*** Take your medications as prescribed


*** Follow your dietary instruction


*** Follow activity as directed








*** Keep your appointments as scheduled


*** Take your immunizations and boosters as scheduled


*** If your symptoms worsen call your PCP, if no PCP go to Urgent Care Center 

or Emergency Room***


*** Smoking is Dangerous to Your Health. Avoid second hand smoke***


***Call the 24-hour hour crisis hotline for domestic abuse at 1-683.955.4237***








Tan Gutierrez MD Jul 31, 2017 11:10

## 2017-07-31 NOTE — GIPROC
[facility]

[address] [facility]. [facility], [address]

 

 

EGD WITH DILATION PROCEDURE REPORT     EXAM DATE: 07/31/2017

 

PATIENT NAME:          Jose Garcia          MR#:       [id number]

YOB: 1930     VISIT #:     [id number]

ATTENDING:     Amanda Nathan MD     ORDER #:     [id number]

ASSISTANT:      Luca Colon and Hilton Prieto      STATUS:     inpatient

 

INDICATIONS:  The patient is a 86 yr old male here for an EGD with dilation due

to dysphagia

hiccups

PROCEDURE PERFORMED:     EGD w/ biopsy

EGD w/ dilation of esophagus via guidewire

MEDICATIONS:     None and Per Anesthesia.

TOPICAL ANESTHETIC:      none

 

CONSENT: The patient understands the risks and benefits of the procedure and

understands that these risks include, but are not limited to: sedation,

allergic reaction, infection, perforation and/or bleeding. Alternative means of

evaluation and treatment include, among others: physical exam, x-rays, and/or

surgical intervention. The patient elects to proceed with this endoscopic

procedure.

 



medical equipment was checked for proper function. Hand hygiene and appropriate

measures for infection prevention was taken. After the risks, benefits and

alternatives of the procedure were thoroughly explained, Informed consent was

verified, confirmed and timeout was successfully executed by the treatment

team. The patient was anesthetized with topical anesthesia and the Pentax

[id number] endoscope was introduced through the mouth and advanced to the second

portion of the duodenum.  The instrument was slowly withdrawn as the mucosa was

fully examined.

Gastritis antrum-biopsy

Schatzki's ring-

esophagitis distal esophagus-biopsy

spastic pylorus

white deposits in esophagus and mouth possible candida

some secretions pulling in hypopharynx.

Dilation was performed at gastroesophageal junction.

 

DILATOR:     SIZE(S):     RESISTANCE:     HEME:     APPEARANCE:

Dilator: Savary over guidewire     Size(s): 16, 17

COMMENT:

Retroflexed views revealed a hiatal hernia

 

 

 

ADVERSE EVENTS:     There were no complications.

IMPRESSIONS:     1.  Gastritis antrum-biopsy

Schatzki's ring-

esophagitis distal esophagus-biopsy

spastic pylorus

white deposits in esophagus and mouth possible candida

some secretions pulling in hypopharynx

2.  Retroflexed views revealed a hiatal hernia

 

RECOMMENDATIONS:     1.  Await biopsy results.  Biopsy results will not be ready

for 7-10 days.  If you don't hear from us in two weeks, call our office for

biopsy results.

2.  Anti-reflux regimen

3.  Continue PPI

4.  Dilatations PRN

5.  Speech theraphy reevaluation

diet consistency as per speech theraphy

if still hiccups consider Baclofen

Ba swallow if still difficulty swallowing

REPEAT EXAM:     EGD pending biopsy results

 

___________________________________

Amanda Nathan MD

eSigned:  Amanda Nathan MD 07/31/2017 11:19 AM

 

 

cc:

 

 

 

 

PATIENT NAME:  Jose Garcia

MR#: [id number]

## 2017-08-01 VITALS
TEMPERATURE: 97.9 F | SYSTOLIC BLOOD PRESSURE: 112 MMHG | OXYGEN SATURATION: 96 % | HEART RATE: 89 BPM | DIASTOLIC BLOOD PRESSURE: 79 MMHG | RESPIRATION RATE: 18 BRPM

## 2017-08-01 VITALS
DIASTOLIC BLOOD PRESSURE: 60 MMHG | HEART RATE: 95 BPM | SYSTOLIC BLOOD PRESSURE: 135 MMHG | TEMPERATURE: 97.2 F | OXYGEN SATURATION: 92 % | RESPIRATION RATE: 20 BRPM

## 2017-08-01 VITALS
HEART RATE: 82 BPM | RESPIRATION RATE: 21 BRPM | TEMPERATURE: 97.8 F | OXYGEN SATURATION: 97 % | SYSTOLIC BLOOD PRESSURE: 128 MMHG | DIASTOLIC BLOOD PRESSURE: 64 MMHG

## 2017-08-01 VITALS
SYSTOLIC BLOOD PRESSURE: 131 MMHG | DIASTOLIC BLOOD PRESSURE: 77 MMHG | RESPIRATION RATE: 20 BRPM | TEMPERATURE: 97.3 F | HEART RATE: 86 BPM | OXYGEN SATURATION: 95 %

## 2017-08-01 VITALS
RESPIRATION RATE: 20 BRPM | DIASTOLIC BLOOD PRESSURE: 68 MMHG | TEMPERATURE: 97.2 F | OXYGEN SATURATION: 94 % | HEART RATE: 83 BPM | SYSTOLIC BLOOD PRESSURE: 141 MMHG

## 2017-08-01 RX ADMIN — SIMETHICONE SCH MG: 125 TABLET, CHEWABLE ORAL at 05:39

## 2017-08-01 RX ADMIN — LORATADINE SCH MG: 10 TABLET ORAL at 10:05

## 2017-08-01 RX ADMIN — NYSTATIN SCH ML: 100000 SUSPENSION ORAL at 10:05

## 2017-08-01 RX ADMIN — MEMANTINE HYDROCHLORIDE SCH MG: 10 TABLET ORAL at 10:05

## 2017-08-01 RX ADMIN — CIPROFLOXACIN HYDROCHLORIDE SCH MG: 500 TABLET, FILM COATED ORAL at 10:05

## 2017-08-01 RX ADMIN — ATENOLOL SCH MG: 25 TABLET ORAL at 10:05

## 2017-08-01 RX ADMIN — PANTOPRAZOLE SCH MG: 40 TABLET, DELAYED RELEASE ORAL at 10:05

## 2017-08-01 RX ADMIN — VITAMIN D, TAB 1000IU (100/BT) SCH UNITS: 25 TAB at 10:05

## 2017-08-01 NOTE — HHI.FF
Face to Face Verification


Diagnosis:  


(1) Generalized weakness


(2) Esophageal stricture


(3) UTI (urinary tract infection)


(4) Knee effusion, left


(5) Aspiration into airway


(6) Dementia


(7) HTN (hypertension)


(8) Diabetes mellitus type 2, diet-controlled


(9) Hyperlipidemia


Physical Therapy


Order:  Evaluate and Treat, Improve ambulation, Strength and gait training





Home Health Nursing


Order:     Nursing assessment with vital signs








I have seen patient Jose Nagy Jr Radha on 8/1/17. My clinical findings 

support the need for the requested home health care services because:


Deconditioned w/ increased weakness


Limited ability to care for self


High risk of falls








I certify that my clinical findings support that this patient is homebound 

because:


Impaired cognitive ability/safety


Unsteady gait/balance


Unsafe to leave home unassisted








Sara Roa Aug 1, 2017 13:57


Horacio Bonds DO Aug 2, 2017 01:28

## 2017-08-01 NOTE — HHI.DS
Discharge Summary


Admission Date


Jul 28, 2017 at 12:18


Discharge Date:  Aug 1, 2017


Admitting Diagnosis


UTI, generalized weakness





(1) Dysphagia


Diagnosis:  Secondary





(2) Knee effusion, left


Diagnosis:  Secondary





(3) UTI (urinary tract infection)


Diagnosis:  Principal





(4) Esophageal stricture


Diagnosis:  Secondary





(5) Generalized weakness


Diagnosis:  Secondary





(6) Atrial fibrillation


Diagnosis:  Secondary





(7) HTN (hypertension)


Diagnosis:  Secondary





(8) Dementia


Diagnosis:  Secondary





(9) Diabetes mellitus type 2, diet-controlled


Diagnosis:  Secondary





Consultants


Dr. Heraclio Perez/Dr. Amanda Nathan - GI


Brief History


Patient is a pleasant 86-year-old male with multiple medical problems including 

hypertension, atrial fibrillation, hyperlipidemia, and dementia.


Most recently patient was hospitalized at [facility] October 30 through November 3

, 2016 due to esophageal stricture, food bolus obstruction of the esophagus, 

and new onset atrial fibrillation.  Patient now returns to the [facility] ER with 

complaints of worsening generalized weakness.  Patient's wife explains that he 

has been having a gradual decline.  Previously patient ambulated with a cane 

only outside of the house, but now he needs the cane to ambulate even with in 

the house.  His fatigue has greatly worsened over the last few days. Patient 

became fatigued yesterday to the point where he was unable to ambulate without 

significant assistance from his family.  Workup in the ER revealed urinary 

tract infection and patient was started on intravenous Rocephin.  Patient 

admitted to [facility] for further evaluation and treatment.





I reviewed patient's last neurology visit with Dr. Yanez (7/21/17).  

Patient complained of fatigue at that time as well.  Dr. Yanez was 

concerned that possibly patient's weakness could be related to his galantamine 

and asked that the family hold the galantamine for a week and see if his 

symptoms improved.





Patient denies chest pain, palpitations, nausea and vomiting, diaphoresis, or 

worsening shortness of breath.


Imaging





Last Impressions








Knee X-Ray 7/28/17 0000 Signed





Impressions: 





 Service Date/Time:  Friday, July 28, 2017 14:12 - CONCLUSION: Joint effusion, 





 chronic changes and no definite fracture.     DUDLEY Hartmann MD 


 


Hip and Pelvis X-Ray 7/28/17 0000 Signed





Impressions: 





 Service Date/Time:  Friday, July 28, 2017 14:09 - CONCLUSION: Slight 

osteopenia. 





     DUDLEY Hartmann MD 


 


Modified Barium Swallow 7/26/17 1553 Signed





Impressions: 





 Service Date/Time:  Wednesday, July 26, 2017 15:53 - CONCLUSION:   1.  No 





 penetration of the supraglottic larynx or tracheal aspiration.   2.  Minimal 





 pooling within the paired vallecula and piriform sinuses.     Branden Shipley MD 


 


Chest X-Ray 7/25/17 0000 Signed





Impressions: 





 Service Date/Time:  Tuesday, July 25, 2017 11:33 - CONCLUSION:  1. Minimal 





 interstitial changes in the left lower lung field with no confluent 

infiltrate. 





 2. Blunting of the left costophrenic angle may represent pleural parenchymal 





 scarring or a small effusion. 3. Old granulomatous disease. Heart size is 





 normal.     Domingo Hahn MD 


 


Head CT 7/22/17 2027 Signed





Impressions: 





 Service Date/Time:  Saturday, July 22, 2017 20:39 - CONCLUSION:  Cerebral 





 atrophy and chronic ischemic small vessel vasculopathy.     Giorgio Lechuga MD 








Hospital Course


Pt is an 87 y/o male with hx os aspiration, esophageal stricture and dilation. 

He was admitted with generalized weakness and was found to have a UTI. Urine 

culture grew out Proteus Mirabilis with sensitivity to Cipro. Pt was fully 

treated with Cipro. During admission he complained of coughing with po intake. 

We consulted ST who initially noted that the pt had overt s/s of aspiration and 

was recommended NPO. Pt underwent evaluation with MBS (7/26) which noted NO 

penetration of the supraglottic larynx or tracheal aspiration with minimal 

pooling within the paired vallecula and piriform sinuses. Pt was noted to have 

a lot of post pharynx secretions. GI was consulted to evaluate as he seemed to 

have trouble handling his own secretion even w/out eating and was felt to be 

high risk for aspiration. Pt underwent evaluation with EGD with dilation on 7/31 /17 which noted gastritis in the antrum, Schatzki's ring and esophagitis in the 

distal esophagus, spastic pylorus, white deposits in esophagus and mouth 

possible candida, and some secretions pooling in the hypopharynx. Dilation was 

performed at the gastroesophageal junction. Pt was started on Nystatin S/S per 

GI. ST followed throughout the admission and on the day of discharge ST 

recommended soft diet with thin liquids and at that time was not having any 

overt S/S of aspiration. 


 


During admission pt c/o left knee effusion. Felt to probably be reactive from 

OA but given chondrocalcinosis on X-ray it could be pseudogout. Pt was given a 

steroid trial with improvement in the left knee effusion and left hip pain. The 

steroids were stopped as the family felt that this was causing hiccups. Pt was 

given Thorazine x one dose for the hiccups and this did not seem to help and 

the family did not want to continue this upon discharge. If his knee pain is 

recurrent then could consider NSAID or colchicine.





Pt and family want to take him home despite PT recommendations for rehab upon 

discharge. The pts wife wants to give it a week at home and if unable to manage 

him then she will contact the pts PCP for rehab placement. A 3008 form was 

filled out and given to the patients wife to take home with them in case they 

decide to opt for rehab. 





Pt needs to followup with his PCP, Dr. Farhat Degroot, in 1 week


Pt need to followup with GI, Dr. Nathan, in 2 weeks. Followup on pathology 

results from EGD


He will continue the Nystatin S/S for 6 days, then stop.


Pt Condition on Discharge:  Stable


Discharge Disposition:  Disch w/ Home Health Serv


Discharge Instructions


DIET: Follow Instructions for:  Heart Healthy Diet


Speech Therapy-Diet Recommends:  Soft


Activities you can perform:  Regular-No Restrictions


Follow up Referrals:  


Gastroenterology - 2 Weeks with Amanda Nathan MD


PCP Follow-up - 1 Week with Dr. Farhat Degroot





New Medications:  


Nystatin Liq (Nystatin Liq) 100,000 unit/ml Susp


5 ML SWISH-SWAL QID candidiasis #1 BOTTLE


 


Continued Medications:  


Amlodipine (Norvasc) 5 Mg Tab


5 MG PO BID Blood Pressure Management #30 Ref 0 TAB


Atenolol (Atenolol) 25 Mg Tab


25 MG PO DAILY Blood Pressure Management #30 TAB


Atorvastatin (Lipitor) 40 Mg Tab


40 MG PO HS Cholesterol Management #30 Ref 0 TAB


Cholecalciferol (Vitamin D3) 1,000 Unit Tab


1000 UNITS PO DAILY Nutritional Supplement #1 Ref 0 BOTTLE


Dabigatran (Pradaxa) 150 Mg Cap


150 MG PO BID resume on 11/4 Blood Clot Prevention #0 Ref 0 CAP


Memantine (Memantine) 10 Mg Tab


10 MG PO BID Alzheimer's Dementia Ref 0 TAB


Pantoprazole (Pantoprazole) 40 Mg Tab


40 MG PO BID Reflux #30 Ref 0 TAB


 


Discontinued Medications:  


Galantamine ER (Galantamine ER) 24 Mg Caper


24 MG PO DAILY Alzheimer's Dementia #30 Ref 0 CAP





Additional Information


Patient examined.


Assessment and plan formulated with Sara Roa PA-C.


I agree with the above.








Sara Roa Aug 1, 2017 14:26


Horacio Bonds DO Aug 2, 2017 01:28

## 2017-08-01 NOTE — HHI.GIFU
Subjective


Remarks


Pt resting in bed, says his swallowing is better since procedure, no issues.  

No problems with swallowing secretions either.  Still has hiccups.





Objective


Vitals I&O





 Vital Signs








  Date Time  Temp Pulse Resp B/P Pulse Ox O2 Delivery O2 Flow Rate FiO2


 


8/1/17 12:00 97.3 86 20 131/77 95   


 


8/1/17 08:00 97.2 95 20 135/60 92   


 


8/1/17 04:00 97.9 89 18 112/79 96   


 


8/1/17 00:00 97.8 82 21 128/64 97   


 


7/31/17 20:55 97.9 82 18 131/58 97   


 


7/31/17 20:00  85      








 I/O








 7/31/17 7/31/17 7/31/17 8/1/17 8/1/17 8/1/17





 07:00 15:00 23:00 07:00 15:00 23:00


 


Intake Total  200 ml    


 


Output Total     400 ml 


 


Balance  200 ml   -400 ml 


 


      


 


Other  200 ml    


 


Output Urine Total     400 ml 


 


# Voids    1 1 


 


# Bowel Movements    1  








Imaging





Last Impressions








Knee X-Ray 7/28/17 0000 Signed





Impressions: 





 Service Date/Time:  Friday, July 28, 2017 14:12 - CONCLUSION: Joint effusion, 





 chronic changes and no definite fracture.     DUDLEY Hartmann MD 


 


Hip and Pelvis X-Ray 7/28/17 0000 Signed





Impressions: 





 Service Date/Time:  Friday, July 28, 2017 14:09 - CONCLUSION: Slight 

osteopenia. 





     DUDLEY Hartmann MD 


 


Modified Barium Swallow 7/26/17 1553 Signed





Impressions: 





 Service Date/Time:  Wednesday, July 26, 2017 15:53 - CONCLUSION:   1.  No 





 penetration of the supraglottic larynx or tracheal aspiration.   2.  Minimal 





 pooling within the paired vallecula and piriform sinuses.     Branden Shipley MD 


 


Chest X-Ray 7/25/17 0000 Signed





Impressions: 





 Service Date/Time:  Tuesday, July 25, 2017 11:33 - CONCLUSION:  1. Minimal 





 interstitial changes in the left lower lung field with no confluent 

infiltrate. 





 2. Blunting of the left costophrenic angle may represent pleural parenchymal 





 scarring or a small effusion. 3. Old granulomatous disease. Heart size is 





 normal.     Domingo Hahn MD 


 


Head CT 7/22/17 2027 Signed





Impressions: 





 Service Date/Time:  Saturday, July 22, 2017 20:39 - CONCLUSION:  Cerebral 





 atrophy and chronic ischemic small vessel vasculopathy.     Giorgio Lechuga MD 








Physical Exam


HEENT: PERRLA; normocephalic; atraumatic; no jaundice.   


CHEST:  CTA


CARDIAC:  irr HR


ABDOMEN:  Soft, nondistended, nontender; no hepatosplenomegaly; bowel sounds x 

4.


EXTREMITIES: No clubbing, cyanosis, edema


SKIN:  Normal; no rash; no jaundice.


CNS: Alert.





Assessment and Plan


Plan


ASSESSMENT


Dysphagia with probable stricture. Patient with history of strictures, multiple 

EGDs & esophageal dilatations. History of 


 aspiration PNA after MBS in 11/2016.  Failed initial swallow eval this 

admission and 2nd one better with recommendation 


 for soft diet and thin liquids. MBS did not show aspiration. Seems to have 

difficulty swallowing secretions. Pradaxa on hold.


 EGD 7-31-17--> s/p dilation, gastritis, esophagitis, schatzkis ring, spastic 

pylorus, white deposits esophagus and mouth poss candida, some secretions 

pooling pharynx, hiatal hernia





PLAN


- f/u with GI as outpatient


- consider baclofen if hiccups persist 


- soft diet and thin liquids per ST


- if dysphagia reoccurs, Barium swallow


- await bx


- Supportive care.


- okay for d/c from GI standpoint





Patient seen and examined by Dr. Nathan and myself and this note is written on 

her behalf.








Marion Olea Aug 1, 2017 16:18

## 2018-03-07 ENCOUNTER — HOSPITAL ENCOUNTER (OUTPATIENT)
Dept: HOSPITAL 17 - NEPC | Age: 83
Setting detail: OBSERVATION
LOS: 3 days | Discharge: HOME | End: 2018-03-10
Attending: HOSPITALIST | Admitting: HOSPITALIST
Payer: MEDICARE

## 2018-03-07 VITALS
DIASTOLIC BLOOD PRESSURE: 74 MMHG | RESPIRATION RATE: 16 BRPM | OXYGEN SATURATION: 96 % | HEART RATE: 85 BPM | SYSTOLIC BLOOD PRESSURE: 116 MMHG | TEMPERATURE: 97.6 F

## 2018-03-07 DIAGNOSIS — F03.90: ICD-10-CM

## 2018-03-07 DIAGNOSIS — G20: ICD-10-CM

## 2018-03-07 DIAGNOSIS — Z79.01: ICD-10-CM

## 2018-03-07 DIAGNOSIS — I48.91: ICD-10-CM

## 2018-03-07 DIAGNOSIS — M19.90: ICD-10-CM

## 2018-03-07 DIAGNOSIS — R05: ICD-10-CM

## 2018-03-07 DIAGNOSIS — K22.2: ICD-10-CM

## 2018-03-07 DIAGNOSIS — E78.00: ICD-10-CM

## 2018-03-07 DIAGNOSIS — I10: ICD-10-CM

## 2018-03-07 DIAGNOSIS — I25.2: ICD-10-CM

## 2018-03-07 DIAGNOSIS — Z79.899: ICD-10-CM

## 2018-03-07 DIAGNOSIS — R13.10: Primary | ICD-10-CM

## 2018-03-07 DIAGNOSIS — R11.2: ICD-10-CM

## 2018-03-07 DIAGNOSIS — K21.9: ICD-10-CM

## 2018-03-07 LAB
ALBUMIN SERPL-MCNC: 3.5 GM/DL (ref 3.4–5)
ALP SERPL-CCNC: 86 U/L (ref 45–117)
ALT SERPL-CCNC: 7 U/L (ref 12–78)
AST SERPL-CCNC: 14 U/L (ref 15–37)
BASOPHILS # BLD AUTO: 0 TH/MM3 (ref 0–0.2)
BASOPHILS NFR BLD: 0.6 % (ref 0–2)
BILIRUB SERPL-MCNC: 1 MG/DL (ref 0.2–1)
BUN SERPL-MCNC: 13 MG/DL (ref 7–18)
CALCIUM SERPL-MCNC: 8.8 MG/DL (ref 8.5–10.1)
CHLORIDE SERPL-SCNC: 108 MEQ/L (ref 98–107)
CREAT SERPL-MCNC: 1.03 MG/DL (ref 0.6–1.3)
EOSINOPHIL # BLD: 0.1 TH/MM3 (ref 0–0.4)
EOSINOPHIL NFR BLD: 0.7 % (ref 0–4)
ERYTHROCYTE [DISTWIDTH] IN BLOOD BY AUTOMATED COUNT: 14.6 % (ref 11.6–17.2)
GFR SERPLBLD BASED ON 1.73 SQ M-ARVRAT: 68 ML/MIN (ref 89–?)
GLUCOSE SERPL-MCNC: 107 MG/DL (ref 74–106)
HCO3 BLD-SCNC: 24.4 MEQ/L (ref 21–32)
HCT VFR BLD CALC: 39 % (ref 39–51)
HGB BLD-MCNC: 13.6 GM/DL (ref 13–17)
INR PPP: 1.1 RATIO
LYMPHOCYTES # BLD AUTO: 1.4 TH/MM3 (ref 1–4.8)
LYMPHOCYTES NFR BLD AUTO: 17.2 % (ref 9–44)
MCH RBC QN AUTO: 33 PG (ref 27–34)
MCHC RBC AUTO-ENTMCNC: 34.9 % (ref 32–36)
MCV RBC AUTO: 94.7 FL (ref 80–100)
MONOCYTE #: 0.9 TH/MM3 (ref 0–0.9)
MONOCYTES NFR BLD: 10.8 % (ref 0–8)
NEUTROPHILS # BLD AUTO: 5.6 TH/MM3 (ref 1.8–7.7)
NEUTROPHILS NFR BLD AUTO: 70.7 % (ref 16–70)
PLATELET # BLD: 177 TH/MM3 (ref 150–450)
PMV BLD AUTO: 9.7 FL (ref 7–11)
PROT SERPL-MCNC: 7.1 GM/DL (ref 6.4–8.2)
PROTHROMBIN TIME: 11.4 SEC (ref 9.8–11.6)
RBC # BLD AUTO: 4.12 MIL/MM3 (ref 4.5–5.9)
SODIUM SERPL-SCNC: 141 MEQ/L (ref 136–145)
WBC # BLD AUTO: 7.9 TH/MM3 (ref 4–11)

## 2018-03-07 PROCEDURE — 92526 ORAL FUNCTION THERAPY: CPT

## 2018-03-07 PROCEDURE — 81001 URINALYSIS AUTO W/SCOPE: CPT

## 2018-03-07 PROCEDURE — 00731 ANES UPR GI NDSC PX NOS: CPT

## 2018-03-07 PROCEDURE — 94150 VITAL CAPACITY TEST: CPT

## 2018-03-07 PROCEDURE — 97161 PT EVAL LOW COMPLEX 20 MIN: CPT

## 2018-03-07 PROCEDURE — 83735 ASSAY OF MAGNESIUM: CPT

## 2018-03-07 PROCEDURE — 80048 BASIC METABOLIC PNL TOTAL CA: CPT

## 2018-03-07 PROCEDURE — 71045 X-RAY EXAM CHEST 1 VIEW: CPT

## 2018-03-07 PROCEDURE — 96376 TX/PRO/DX INJ SAME DRUG ADON: CPT

## 2018-03-07 PROCEDURE — 96374 THER/PROPH/DIAG INJ IV PUSH: CPT

## 2018-03-07 PROCEDURE — C1726 CATH, BAL DIL, NON-VASCULAR: HCPCS

## 2018-03-07 PROCEDURE — 97110 THERAPEUTIC EXERCISES: CPT

## 2018-03-07 PROCEDURE — 92610 EVALUATE SWALLOWING FUNCTION: CPT

## 2018-03-07 PROCEDURE — 85610 PROTHROMBIN TIME: CPT

## 2018-03-07 PROCEDURE — 97116 GAIT TRAINING THERAPY: CPT

## 2018-03-07 PROCEDURE — G0378 HOSPITAL OBSERVATION PER HR: HCPCS

## 2018-03-07 PROCEDURE — 85730 THROMBOPLASTIN TIME PARTIAL: CPT

## 2018-03-07 PROCEDURE — 96361 HYDRATE IV INFUSION ADD-ON: CPT

## 2018-03-07 PROCEDURE — C9113 INJ PANTOPRAZOLE SODIUM, VIA: HCPCS

## 2018-03-07 PROCEDURE — 80053 COMPREHEN METABOLIC PANEL: CPT

## 2018-03-07 PROCEDURE — 43249 ESOPH EGD DILATION <30 MM: CPT

## 2018-03-07 PROCEDURE — 85025 COMPLETE CBC W/AUTO DIFF WBC: CPT

## 2018-03-07 PROCEDURE — 99285 EMERGENCY DEPT VISIT HI MDM: CPT

## 2018-03-07 RX ADMIN — Medication SCH ML: at 21:40

## 2018-03-07 RX ADMIN — PANTOPRAZOLE SODIUM SCH MG: 40 INJECTION, POWDER, FOR SOLUTION INTRAVENOUS at 21:39

## 2018-03-07 RX ADMIN — CARBIDOPA AND LEVODOPA SCH TAB: 25; 100 TABLET ORAL at 22:14

## 2018-03-07 RX ADMIN — THIAMINE HYDROCHLORIDE SCH MLS/HR: 100 INJECTION, SOLUTION INTRAMUSCULAR; INTRAVENOUS at 21:39

## 2018-03-07 RX ADMIN — MEMANTINE HYDROCHLORIDE SCH MG: 10 TABLET ORAL at 23:30

## 2018-03-07 RX ADMIN — STANDARDIZED SENNA CONCENTRATE AND DOCUSATE SODIUM SCH TAB: 8.6; 5 TABLET, FILM COATED ORAL at 21:39

## 2018-03-07 NOTE — HHI.HP
HPI


Service


[facility]


Primary Care Physician


Farhat Degroot MD


Admission Diagnosis


dysphagia


Chief Complaint:  


difficulty swallowing and vomit today


Travel History


International Travel<30 Days:  No


Contact w/Intl Traveler <30 Da:  No


Traveled to Known Affected Are:  No


History of Present Illness





87-year-old male presents to the emergency department  for evaluation of 

possible esophageal stricture.  Patient states that since yesterday morning, he 

has been unable to keep any food or liquid down.  He will swallow and cough it 

right back up.  According to the family, he has been having trouble for the 

past couple weeks, but since yesterday morning has not been able to eat or 

drink anything.  He denies any other symptoms or complaints.  No headache.  No 

fevers or chills.  No chest pain or shortness breath.  No abdominal pain.  No 

nausea, vomiting, diarrhea.  According to family, last time he had esophageal 

stricture they did a barium swallow and he wound up with pneumonia.  ER 

discussed case with GI and they want admit for endoscopy in am with hx 

esophageal stricture.





Review of Systems


Gastrointestinal:  COMPLAINS OF: Nausea, Vomiting, Difficulty Swallowing





Past Family Social History


Past Medical History


a fib,parkinson,hyperlipid,esophageal stricture hypertension,mild dementia


Past Surgical History


retinal surgery,RT TKA prostate surgery appendix


Reported Medications


carbidopa 25 qid,vit d pantoprozole 40 bid,memtine 10 bid,norvasc 5 bid,lipitor 

40 atenol 25


Allergies:  


Coded Allergies:  


     Sulfa (Sulfonamide Antibiotics) (Unverified  Allergy, Severe, MIGRAINES, 

RED EYES, 8/15/17)


Social History


NS,ND





Physical Exam


Vital Signs





Vital Signs








  Date Time  Temp Pulse Resp B/P (MAP) Pulse Ox O2 Delivery O2 Flow Rate FiO2


 


3/7/18 16:33 97.6 85 16 116/74 (88) 96   








Physical Exam


GENERAL: This is a well-nourished, well-developed patient, in no apparent 

distress.


SKIN: No rashes, ecchymoses or lesions. Cool and dry.


HEAD: Atraumatic. Normocephalic. No temporal or scalp tenderness.


EYES: Pupils equal round and reactive. Extraocular motions intact. No scleral 

icterus. No injection or drainage. 


ENT: Nose without bleeding, purulent drainage or septal hematoma. Throat 

without erythema, tonsillar hypertrophy or exudate. Uvula midline. Airway 

patent.


NECK: Trachea midline. No JVD or lymphadenopathy. Supple, nontender, no 

meningeal signs.


CARDIOVASCULAR: Regular rate and rhythm without murmurs, gallops, or rubs. 


RESPIRATORY: Clear to auscultation. Breath sounds equal bilaterally. No wheezes

, rales, or rhonchi.  


GASTROINTESTINAL: Abdomen soft, non-tender, nondistended. No hepato-splenomegaly

, or palpable masses. No guarding.


MUSCULOSKELETAL: Extremities without clubbing, cyanosis, or edema. No joint 

tenderness, effusion, or edema noted. No calf tenderness. Negative Homans sign 

bilaterally.


NEUROLOGICAL: Awake and alert. Cranial nerves II through XII intact.  Motor and 

sensory grossly within normal limits. Five out of 5 muscle strength in all 

muscle groups.  Normal speech.


Laboratory





Laboratory Tests








Test


  3/7/18


17:30


 


White Blood Count 7.9 


 


Red Blood Count 4.12 


 


Hemoglobin 13.6 


 


Hematocrit 39.0 


 


Mean Corpuscular Volume 94.7 


 


Mean Corpuscular Hemoglobin 33.0 


 


Mean Corpuscular Hemoglobin


Concent 34.9 


 


 


Red Cell Distribution Width 14.6 


 


Platelet Count 177 


 


Mean Platelet Volume 9.7 


 


Neutrophils (%) (Auto) 70.7 


 


Lymphocytes (%) (Auto) 17.2 


 


Monocytes (%) (Auto) 10.8 


 


Eosinophils (%) (Auto) 0.7 


 


Basophils (%) (Auto) 0.6 


 


Neutrophils # (Auto) 5.6 


 


Lymphocytes # (Auto) 1.4 


 


Monocytes # (Auto) 0.9 


 


Eosinophils # (Auto) 0.1 


 


Basophils # (Auto) 0.0 


 


CBC Comment DIFF FINAL 


 


Differential Comment  


 


Prothrombin Time 11.4 


 


Prothromb Time International


Ratio 1.1 


 


 


Activated Partial


Thromboplast Time 32.0 


 


 


Blood Urea Nitrogen 13 


 


Creatinine 1.03 


 


Random Glucose 107 


 


Albumin 3.5 


 


Calcium Level 8.8 


 


Aspartate Amino Transf


(AST/SGOT) 14 


 


 


Alanine Aminotransferase


(ALT/SGPT) 7 


 


 


Sodium Level 141 


 


Potassium Level 4.4 


 


Chloride Level 108 


 


Carbon Dioxide Level 24.4 


 


Anion Gap 9 


 


Estimat Glomerular Filtration


Rate 68 


 








Result Diagram:  


3/7/18 1730                                                                    

            3/7/18 1730





Imaging





Last 24 hours Impressions








Chest X-Ray 3/7/18 0000 Signed





Impressions: 





 Service Date/Time:  2018 17:08 - CONCLUSION: No acute 





 disease.       Thomas Green Jr., MD Caprini VTE Risk Assessment


Caprini VTE Risk Assessment:  Mod/High Risk (score >= 2)


Caprini Risk Assessment Model











 Point Value = 1          Point Value = 2  Point Value = 3  Point Value = 5


 


Age 41-60


Minor surgery


BMI > 25 kg/m2


Swollen legs


Varicose veins


Pregnancy or postpartum


History of unexplained or recurrent


   spontaneous 


Oral contraceptives or hormone


   replacement


Sepsis (< 1 month)


Serious lung disease, including


   pneumonia (< 1 month)


Abnormal pulmonary function


Acute myocardial infarction


Congestive heart failure (< 1 month)


History of inflammatory bowel disease


Medical patient at bed rest Age 61-74


Arthroscopic surgery


Major open surgery (> 45 min)


Laparoscopic surgery (> 45 min)


Malignancy


Confined to bed (> 72 hours)


Immobilizing plaster cast


Central venous access Age >= 75


History of VTE


Family history of VTE


Factor V Leiden


Prothrombin 14601G


Lupus anticoagulant


Anticardiolipin antibodies


Elevated serum homocysteine


Heparin-induced thrombocytopenia


Other congenital or acquired


   thrombophilia Stroke (< 1 month)


Elective arthroplasty


Hip, pelvis, or leg fracture


Acute spinal cord injury (< 1 month)








Prophylaxis Regimen











   Total Risk


Factor Score Risk Level Prophylaxis Regimen


 


0-1      Low Early ambulation


 


2 Moderate Order ONE of the following:


*Sequential Compression Device (SCD)


*Heparin 5000 units SQ BID


 


3-4 Higher Order ONE of the following medications:


*Heparin 5000 units SQ TID


*Enoxaparin/Lovenox 40 mg SQ daily (WT < 150 kg, CrCl > 30 mL/min)


*Enoxaparin/Lovenox 30 mg SQ daily (WT < 150 kg, CrCl > 10-29 mL/min)


*Enoxaparin/Lovenox 30 mg SQ BID (WT < 150 kg, CrCl > 30 mL/min)


AND/OR


*Sequential Compression Device (SCD)


 


5 or more Highest Order ONE of the following medications:


*Heparin 5000 units SQ TID (Preferred with Epidurals)


*Enoxaparin/Lovenox 40 mg SQ daily (WT < 150 kg, CrCl > 30 mL/min)


*Enoxaparin/Lovenox 30 mg SQ daily (WT < 150 kg, CrCl > 10-29 mL/min)


*Enoxaparin/Lovenox 30 mg SQ BID (WT < 150 kg, CrCl > 30 mL/min)


AND


*Sequential Compression Device (SCD)











Assessment and Plan


Problem List:  


(1) Dysphagia


ICD Codes:  R13.10 - Dysphagia, unspecified


Status:  Acute


Plan:  case discussed with GI admit NPO after midnight GI consult Dr. Harris,

for endoscopy 





(2) Nausea & vomiting


ICD Codes:  R11.2 - Nausea with vomiting, unspecified


Plan:  zofran prn and protonix IV





(3) Afib


ICD Codes:  I48.91 - Unspecified atrial fibrillation


Plan:  on atenolol continue hold pradaxa tonight





(4) Parkinson disease


ICD Codes:  G20 - Parkinson's disease


Plan:  continue home meds





Assessment and Plan


as above


Code Status


full


Discussed Condition With


patient family











Micky De Jesus MD Mar 7, 2018 18:37

## 2018-03-07 NOTE — PD
HPI


Chief Complaint:  GI Complaint


Time Seen by Provider:  16:53


Travel History


International Travel<30 days:  No


Contact w/Intl Traveler<30days:  No


Traveled to known affect area:  No





History of Present Illness


HPI


87-year-old male presents to the emergency department with his daughter 

granddaughter at bedside for evaluation of possible esophageal stricture.  

Patient states that since yesterday morning, he has been unable to keep any 

food or liquid down.  He will swallow and cough it right back up.  According to 

the family, he has been having trouble for the past couple weeks, but since 

yesterday morning has not been able to eat or drink anything.  He denies any 

other symptoms or complaints.  No headache.  No fevers or chills.  No chest 

pain or shortness breath.  No abdominal pain.  No nausea, vomiting, diarrhea.  

According to family, last time he had esophageal stricture they did a barium 

swallow and he wound up with pneumonia.  His gastroenterologist is Dr. Nathan.  

No exacerbating or alleviating factors.  Moderate severity.





PFSH


Past Medical History


Hx Anticoagulant Therapy:  Yes


Arthritis:  Yes


Heart Rhythm Problems:  Yes


Cancer:  Yes


Cardiovascular Problems:  Yes (a fib)


High Cholesterol:  Yes


Chemotherapy:  No


Chest Pain:  No


Congestive Heart Failure:  No


Cerebrovascular Accident:  No


Diabetes:  No


Diminished Hearing:  No


Endocrine:  No


Gastrointestinal Disorders:  Yes (Reflux)


GERD:  Yes


Genitourinary:  No


Hepatitis:  No


Hiatal Hernia:  No


Hypertension:  Yes


Immune Disorder:  No


Implanted Vascular Access Dvce:  Yes


Musculoskeletal:  Yes (ARTHRITIS)


Neurologic:  Yes (DEMENTIA)


Psychiatric:  No


Reproductive:  No


Respiratory:  No


Radiation Therapy:  No


Thyroid Disease:  No





Past Surgical History


Abdominal Surgery:  No


AICD:  No


Appendectomy:  Yes


Body Medical Devices:  Right knee


Cardiac Surgery:  No


Ear Surgery:  No


Eye Surgery:  Yes (DETACHED RETINA REPAIR)


Genitourinary Surgery:  Yes


Gynecologic Surgery:  No


Joint Replacement:  Yes (R TKA)


Oral Surgery:  No


Pacemaker:  No


Prostatectomy:  Yes (Partial)


Thoracic Surgery:  No


Other Surgery:  Yes (Right knee, appentecdomy, TURP, esoph stricture stretched)





Social History


Alcohol Use:  Yes (2-3 times weekly)


Tobacco Use:  No (QUIT 1965)


Substance Use:  No





Allergies-Medications


(Allergen,Severity, Reaction):  


Coded Allergies:  


     Sulfa (Sulfonamide Antibiotics) (Unverified  Allergy, Severe, MIGRAINES, 

RED EYES, 8/15/17)


Reported Meds & Prescriptions





Reported Meds & Active Scripts


Active


Pradaxa (Dabigatran) 150 Mg Cap 150 Mg PO BID


     resume on 11/4


Reported


Vitamin D3 (Cholecalciferol) 1,000 Unit Tab 1,000 Units PO DAILY


Pantoprazole (Pantoprazole Sodium) 40 Mg Tab 40 Mg PO BID


Memantine 10 Mg Tab 10 Mg PO BID


Norvasc (Amlodipine Besylate) 5 Mg Tab 5 Mg PO BID


Lipitor (Atorvastatin Calcium) 40 Mg Tab 40 Mg PO HS


Atenolol 25 Mg Tab 25 Mg PO DAILY








Review of Systems


Except as stated in HPI:  all other systems reviewed are Neg





Physical Exam


Narrative


GENERAL: Well-nourished, well-developed male patient, afebrile.


SKIN: Focused skin assessment warm/dry.


HEAD: Normocephalic.  Atraumatic.


ENT: Mucosa pink and moist. No erythema or exudates. No uvular edema. No uvular

, palatal, or tonsillar deviation. Airway patent. Nasal turbinates appear 

normal without nasal blood, purulent drainage or septal hematoma.


EYES: No scleral icterus. No injection or drainage. 


NECK: Supple, trachea midline. No JVD or lymphadenopathy.


CARDIOVASCULAR: Regular rate and rhythm without murmurs, gallops, or rubs. 


RESPIRATORY: Breath sounds equal bilaterally. No accessory muscle use.  Lungs 

sounds are clear to auscultation.


GASTROINTESTINAL: Abdomen soft, non-tender, nondistended. 


MUSCULOSKELETAL: No cyanosis, or edema. 


BACK: Nontender without obvious deformity. No CVA tenderness.





Data


Data


Last Documented VS





Vital Signs








  Date Time  Temp Pulse Resp B/P (MAP) Pulse Ox O2 Delivery O2 Flow Rate FiO2


 


3/7/18 16:33 97.6 85 16 116/74 (88) 96   








Orders





 Orders


Complete Blood Count With Diff (3/7/18 16:35)


Comprehensive Metabolic Panel (3/7/18 16:35)


Act Partial Throm Time (Ptt) (3/7/18 16:35)


Prothrombin Time / Inr (Pt) (3/7/18 16:35)


Chest, Single Ap (3/7/18 )


Iv Access Insert/Monitor (3/7/18 17:04)


Sodium Chlor 0.9% 1000 Ml Inj (Ns 1000 M (3/7/18 17:15)


Amlodipine (Norvasc) (3/7/18 21:00)


Atenolol (Tenormin) (3/8/18 09:00)


Cholecalciferol (Vitamin D3) (3/8/18 09:00)


Memantine (Namenda) (3/7/18 21:00)


Carbidopa-Levodopa  Mg (Sinemet 25 (3/8/18 00:00)


Enalaprilat Inj (Vasotec Inj) (3/7/18 18:30)


Admit Order (Ed Use Only) (3/7/18 18:32)





Labs





Laboratory Tests








Test


  3/7/18


17:30


 


White Blood Count 7.9 TH/MM3 


 


Red Blood Count 4.12 MIL/MM3 


 


Hemoglobin 13.6 GM/DL 


 


Hematocrit 39.0 % 


 


Mean Corpuscular Volume 94.7 FL 


 


Mean Corpuscular Hemoglobin 33.0 PG 


 


Mean Corpuscular Hemoglobin


Concent 34.9 % 


 


 


Red Cell Distribution Width 14.6 % 


 


Platelet Count 177 TH/MM3 


 


Mean Platelet Volume 9.7 FL 


 


Neutrophils (%) (Auto) 70.7 % 


 


Lymphocytes (%) (Auto) 17.2 % 


 


Monocytes (%) (Auto) 10.8 % 


 


Eosinophils (%) (Auto) 0.7 % 


 


Basophils (%) (Auto) 0.6 % 


 


Neutrophils # (Auto) 5.6 TH/MM3 


 


Lymphocytes # (Auto) 1.4 TH/MM3 


 


Monocytes # (Auto) 0.9 TH/MM3 


 


Eosinophils # (Auto) 0.1 TH/MM3 


 


Basophils # (Auto) 0.0 TH/MM3 


 


CBC Comment DIFF FINAL 


 


Differential Comment  


 


Prothrombin Time 11.4 SEC 


 


Prothromb Time International


Ratio 1.1 RATIO 


 


 


Activated Partial


Thromboplast Time 32.0 SEC 


 


 


Blood Urea Nitrogen 13 MG/DL 


 


Creatinine 1.03 MG/DL 


 


Random Glucose 107 MG/DL 


 


Total Protein 7.1 GM/DL 


 


Albumin 3.5 GM/DL 


 


Calcium Level 8.8 MG/DL 


 


Alkaline Phosphatase 86 U/L 


 


Aspartate Amino Transf


(AST/SGOT) 14 U/L 


 


 


Alanine Aminotransferase


(ALT/SGPT) 7 U/L 


 


 


Total Bilirubin 1.0 MG/DL 


 


Sodium Level 141 MEQ/L 


 


Potassium Level 4.4 MEQ/L 


 


Chloride Level 108 MEQ/L 


 


Carbon Dioxide Level 24.4 MEQ/L 


 


Anion Gap 9 MEQ/L 


 


Estimat Glomerular Filtration


Rate 68 ML/MIN 


 











MDM


Medical Decision Making


Medical Screen Exam Complete:  Yes


Emergency Medical Condition:  Yes


Medical Record Reviewed:  Yes


Interpretation(s)


chest x-ray - CONCLUSION:     No acute disease.


Differential Diagnosis


Esophageal stricture versus GERD versus dysphagia versus pneumonia versus 

electrolyte abnormality versus dehydration


Narrative Course


87-year-old male presents to the emergency department unable to eat or drink 

anything since yesterday morning with a history of esophageal stricture.  IV is 

established.  Patient is given 1 L IV normal saline.  CBC, CMP, PTT, PT/INR, 

chest x-ray ordered and pending.





CBC shows no acute abnormality.  CMP shows no acute abnormality.  Coags show no 

acute abnormalities.  Chest x-ray shows no acute disease.





I spoke with gastroenterologist, Dr. Harris, who recommends admission and 

consultation.





Dr. Johansen accepted admission.





Diagnosis





 Primary Impression:  


 Esophageal stricture





Admitting Information


Admitting Physician Requests:  Daiana Ledesma Mar 7, 2018 17:11

## 2018-03-07 NOTE — RADRPT
EXAM DATE/TIME:  03/07/2018 17:08 

 

[facility] COMPARISON:     

CHEST SINGLE AP, July 25, 2017, 11:33.

 

                     

INDICATIONS :     

Cough. Patient states difficulty swallowing.

                     

 

MEDICAL HISTORY :            

Gastroesophageal reflux disease. Hypertension Myocardial infarction.   

 

SURGICAL HISTORY :     

None.   

 

ENCOUNTER:     

Initial                                        

 

ACUITY:     

1 week      

 

PAIN SCORE:     

0/10

 

LOCATION:     

Bilateral chest 

 

FINDINGS:     

A single view of the chest demonstrates the lungs to be symmetrically aerated without evidence of mas
s, infiltrate or effusion.  The cardiomediastinal contours are unremarkable.  Osseous structures are 
intact.

 

CONCLUSION:     No acute disease.  

 

 

 

 Jassi Godwin Jr., MD on March 07, 2018 at 17:28           

Board Certified Radiologist.

 This report was verified electronically.

## 2018-03-08 VITALS
TEMPERATURE: 97.5 F | SYSTOLIC BLOOD PRESSURE: 125 MMHG | DIASTOLIC BLOOD PRESSURE: 84 MMHG | OXYGEN SATURATION: 92 % | RESPIRATION RATE: 16 BRPM | HEART RATE: 76 BPM

## 2018-03-08 VITALS — HEART RATE: 79 BPM

## 2018-03-08 VITALS
RESPIRATION RATE: 20 BRPM | HEART RATE: 71 BPM | DIASTOLIC BLOOD PRESSURE: 72 MMHG | OXYGEN SATURATION: 95 % | SYSTOLIC BLOOD PRESSURE: 137 MMHG | TEMPERATURE: 98.4 F

## 2018-03-08 VITALS
TEMPERATURE: 98.1 F | SYSTOLIC BLOOD PRESSURE: 134 MMHG | RESPIRATION RATE: 20 BRPM | DIASTOLIC BLOOD PRESSURE: 89 MMHG | HEART RATE: 80 BPM | OXYGEN SATURATION: 96 %

## 2018-03-08 VITALS
OXYGEN SATURATION: 93 % | SYSTOLIC BLOOD PRESSURE: 136 MMHG | TEMPERATURE: 98.2 F | RESPIRATION RATE: 18 BRPM | DIASTOLIC BLOOD PRESSURE: 74 MMHG | HEART RATE: 83 BPM

## 2018-03-08 VITALS
SYSTOLIC BLOOD PRESSURE: 117 MMHG | OXYGEN SATURATION: 94 % | DIASTOLIC BLOOD PRESSURE: 79 MMHG | TEMPERATURE: 98.5 F | RESPIRATION RATE: 16 BRPM | HEART RATE: 78 BPM

## 2018-03-08 LAB
COLOR UR: YELLOW
GLUCOSE UR STRIP-MCNC: (no result) MG/DL
HGB UR QL STRIP: (no result)
KETONES UR STRIP-MCNC: (no result) MG/DL
MUCOUS THREADS #/AREA URNS LPF: (no result) /LPF
NITRITE UR QL STRIP: (no result)
SP GR UR STRIP: 1.01 (ref 1–1.03)
URINE LEUKOCYTE ESTERASE: (no result)

## 2018-03-08 RX ADMIN — MEMANTINE HYDROCHLORIDE SCH MG: 10 TABLET ORAL at 21:00

## 2018-03-08 RX ADMIN — STANDARDIZED SENNA CONCENTRATE AND DOCUSATE SODIUM SCH TAB: 8.6; 5 TABLET, FILM COATED ORAL at 21:00

## 2018-03-08 RX ADMIN — VITAMIN D, TAB 1000IU (100/BT) SCH UNITS: 25 TAB at 09:00

## 2018-03-08 RX ADMIN — Medication SCH ML: at 09:00

## 2018-03-08 RX ADMIN — MEMANTINE HYDROCHLORIDE SCH MG: 10 TABLET ORAL at 09:00

## 2018-03-08 RX ADMIN — ATENOLOL SCH MG: 25 TABLET ORAL at 09:00

## 2018-03-08 RX ADMIN — CARBIDOPA AND LEVODOPA SCH TAB: 25; 250 TABLET ORAL at 18:00

## 2018-03-08 RX ADMIN — THIAMINE HYDROCHLORIDE SCH MLS/HR: 100 INJECTION, SOLUTION INTRAMUSCULAR; INTRAVENOUS at 08:50

## 2018-03-08 RX ADMIN — PANTOPRAZOLE SODIUM SCH MG: 40 INJECTION, POWDER, FOR SOLUTION INTRAVENOUS at 22:48

## 2018-03-08 RX ADMIN — Medication SCH ML: at 21:00

## 2018-03-08 RX ADMIN — CARBIDOPA AND LEVODOPA SCH TAB: 25; 100 TABLET ORAL at 12:00

## 2018-03-08 RX ADMIN — CARBIDOPA AND LEVODOPA SCH TAB: 25; 100 TABLET ORAL at 05:11

## 2018-03-08 RX ADMIN — PANTOPRAZOLE SODIUM SCH MG: 40 INJECTION, POWDER, FOR SOLUTION INTRAVENOUS at 09:03

## 2018-03-08 RX ADMIN — STANDARDIZED SENNA CONCENTRATE AND DOCUSATE SODIUM SCH TAB: 8.6; 5 TABLET, FILM COATED ORAL at 09:00

## 2018-03-08 NOTE — GIPROC
[facility]

[address] [facility]. [facility] [place], [address]

 

 

EGD PROCEDURE REPORT     EXAM DATE: 03/08/2018

 

PATIENT NAME:      Jose Garcia           MR #:      [id number]

YOB: 1930      VISIT #:     [id number]

ATTENDING:     Alyssa Harris MD     ORDER #:     [id number]

ASSISTANT:      Kathy Finley and Kaitlin Greer     STATUS:     inpatient

 

 

INDICATIONS:  The patient is a 87 yr old male here for an EGD due to dysphagia

 

PROCEDURE PERFORMED:     EGD w/ balloon dilation of esophagus

MEDICATIONS:     None and Per Anesthesia.

TOPICAL ANESTHETIC:     none

 

CONSENT: The patient understands the risks and benefits of the procedure and

understands that these risks include, but are not limited to: sedation,

allergic reaction, infection, perforation and/or bleeding. Alternative means of

evaluation and treatment include, among others: physical exam, x-rays, and/or

surgical intervention. The patient elects to proceed with this endoscopic

procedure.

 



medical equipment was checked for proper function. Hand hygiene and appropriate

measures for infection prevention was taken. After the risks, benefits and

alternatives of the procedure were thoroughly explained, Informed consent was

verified, confirmed and timeout was successfully executed by the treatment

team. The patient was anesthetized with topical anesthesia and the Pentax

[id number] endoscope was introduced through the mouth and advanced to the second

portion of the duodenum.  Retroflexion was performed and was normal  The

gastroscope was then slowly withdrawn and removed.

 

ESOPHAGUS: A moderately severe Schatzki ring was found in the distal esophagus

and was barely passable by the endoscope used.  Using a TTS-balloon the

stricture was dilated up to 15mm.  The balloon was held inflated for 30

seconds.  Following this dilation, there was no change in the appearance of the

stricture.   Using a TTS-balloon the stricture was dilated up to 16mm.  The

balloon was held inflated for 30 seconds.  Following this dilation, there was a

small mucosal rent.

 

 

 

ADVERSE EVENTS:     There were no complications.

IMPRESSIONS:     1.  Schatzki ring was found in the distal esophagus; Using a

TTS-balloon the stricture was dilated up to 15mm; The balloon was held inflated

for 30 seconds; Following this dilation, there was no change in the appearance

of the stricture , followed by 16 mm with minor mucosal rent.

2.  Retroflexion was performed and was normal

 

RECOMMENDATIONS:     Dilatations PRN

PATIENT CONDITION:     stable

DISPOSITION:     Observation

REPEAT EXAM:     Return as needed for Dilatation

 

 

___________________________________

Alyssa Harris MD

eSigned:  Alyssa Harris MD 03/08/2018 11:51 AM

 

 

cc:

 

 

 

 

PATIENT NAME:  Jose Garcia HUNTER

MR#: [id number]

## 2018-03-08 NOTE — RADRPT
EXAM DATE/TIME:  03/08/2018 12:26 

 

[facility] COMPARISON:     

CHEST SINGLE AP, March 07, 2018, 17:08.

 

                     

INDICATIONS :     

Congestion, cough post EGD w/ dilatation.

                     

 

MEDICAL HISTORY :            

Gastroesophageal reflux disease. Hypertension Myocardial infarction   

 

SURGICAL HISTORY :     

None.   

 

ENCOUNTER:     

Initial                                        

 

ACUITY:     

1 day      

 

PAIN SCORE:     

0/10

 

LOCATION:     

Bilateral chest 

 

FINDINGS:     

The examination demonstrates atelectasis and infiltrate in the left lung base. This is new compared t
o previous. The right lung is clear.

 

The heart is normal in size. The mediastinal contour is within normal limits.

 

There degenerative changes in the osseous structures.

 

CONCLUSION:     

1. Left basilar atelectasis.

 

 

 

 Rober Devlin MD on March 08, 2018 at 12:57           

Board Certified Radiologist.

 This report was verified electronically.

## 2018-03-08 NOTE — PD.CONS
HPI


History of Present Illness


This is a 87 year old male who presented to ER with dysphagia.  He has had 

difficulty swallowing with coughing and regurgitation for the last 3 weeks.  It 

has worsened in the last 3 days after he took a bunch of pills at once. he is 

also having a burning sensation in his throat. He is now unable to swallow 

liquids or solids and cannot swallow his own secretions, he just regurgitates 

or coughs them back up.  He had an EGD and dilatation 7/2017 with Dr Nathan and 

finding of gastritis, schatzki ring, spatic pylorus, poss candida, pooling 

secretions pharynx, hiatal hernia.  he takes Pradaxa for AF but has not had 

this in 4 days.  Denies abd pain, painful swallowing, n/v, blood in stool.





Delayed entry, pt seen @ 0900.


 (Marion Olea)





PFSH


Past Medical History


AF


Parkinsons


Dementia


Past Surgical History


appendectomy


TURP


retinal surgery


 (Marion Olea)


Coded Allergies:  


     Sulfa (Sulfonamide Antibiotics) (Verified  Allergy, Severe, MIGRAINES, RED 

EYES, 3/8/18)


Family History


none


Social History


occasional etoh


no tobacco or illicit drug use


 (Marion Olea)





Review of Systems


Constitutional:  DENIES: Fever


Endocrine:  DENIES: Polydipsia


Ears, nose, mouth, throat:  DENIES: Hearing loss


Respiratory:  DENIES: Cough


Cardiovascular:  DENIES: Chest pain


Gastrointestinal:  COMPLAINS OF: Difficulty Swallowing, DENIES: Abdominal pain, 

Black stools, Bloody stools, Nausea, Vomiting, Hematemesis


Genitourinary:  DENIES: Urinary frequency


Musculoskeletal:  DENIES: Muscle aches


Integumentary:  DENIES: Abnormal pigmentation


Hematologic/lymphatic:  DENIES: Bruising


Immunologic/allergic:  DENIES: Eczema


Neurologic:  DENIES: Headache


Psychiatric:  DENIES: Anxiety (Marion Olea)





GI Exam


Vitals I&O





Vital Signs








  Date Time  Temp Pulse Resp B/P (MAP) Pulse Ox O2 Delivery O2 Flow Rate FiO2


 


3/8/18 07:42 98.4 71 20 137/72 (93) 95   


 


3/8/18 05:25 98.5 78 16 117/79 (92) 94   


 


3/8/18 01:17 97.5 76 16 125/84 (98) 92   


 


3/7/18 16:33 97.6 85 16 116/74 (88) 96   














I/O      


 


 3/7/18 3/7/18 3/7/18 3/8/18 3/8/18 3/8/18





 07:00 15:00 23:00 07:00 15:00 23:00


 


Intake Total   1000 ml 1010 ml 400 ml 


 


Balance   1000 ml 1010 ml 400 ml 


 


      


 


Intake Oral    210 ml  


 


IV Total   1000 ml 800 ml  


 


Other     400 ml 








Imaging





Last Impressions








Chest X-Ray 3/7/18 0000 Signed





Impressions: 





 Service Date/Time:  Wednesday, March 7, 2018 17:08 - CONCLUSION: No acute 





 disease.       Jassi Godwin Jr., MD 








Laboratory











Test


  3/7/18


17:30


 


White Blood Count 7.9 TH/MM3 


 


Red Blood Count 4.12 MIL/MM3 


 


Hemoglobin 13.6 GM/DL 


 


Hematocrit 39.0 % 


 


Mean Corpuscular Volume 94.7 FL 


 


Mean Corpuscular Hemoglobin 33.0 PG 


 


Mean Corpuscular Hemoglobin


Concent 34.9 % 


 


 


Red Cell Distribution Width 14.6 % 


 


Platelet Count 177 TH/MM3 


 


Mean Platelet Volume 9.7 FL 


 


Neutrophils (%) (Auto) 70.7 % 


 


Lymphocytes (%) (Auto) 17.2 % 


 


Monocytes (%) (Auto) 10.8 % 


 


Eosinophils (%) (Auto) 0.7 % 


 


Basophils (%) (Auto) 0.6 % 


 


Neutrophils # (Auto) 5.6 TH/MM3 


 


Lymphocytes # (Auto) 1.4 TH/MM3 


 


Monocytes # (Auto) 0.9 TH/MM3 


 


Eosinophils # (Auto) 0.1 TH/MM3 


 


Basophils # (Auto) 0.0 TH/MM3 


 


CBC Comment DIFF FINAL 


 


Differential Comment  


 


Prothrombin Time 11.4 SEC 


 


Prothromb Time International


Ratio 1.1 RATIO 


 


 


Activated Partial


Thromboplast Time 32.0 SEC 


 


 


Blood Urea Nitrogen 13 MG/DL 


 


Creatinine 1.03 MG/DL 


 


Random Glucose 107 MG/DL 


 


Total Protein 7.1 GM/DL 


 


Albumin 3.5 GM/DL 


 


Calcium Level 8.8 MG/DL 


 


Alkaline Phosphatase 86 U/L 


 


Aspartate Amino Transf


(AST/SGOT) 14 U/L 


 


 


Alanine Aminotransferase


(ALT/SGPT) 7 U/L 


 


 


Total Bilirubin 1.0 MG/DL 


 


Sodium Level 141 MEQ/L 


 


Potassium Level 4.4 MEQ/L 


 


Chloride Level 108 MEQ/L 


 


Carbon Dioxide Level 24.4 MEQ/L 


 


Anion Gap 9 MEQ/L 


 


Estimat Glomerular Filtration


Rate 68 ML/MIN 


 








Physical Examination


HEENT: PERRL; normocephalic; atraumatic; no jaundice.   


CHEST:  CTA


CARDIAC:  irregular HR


ABDOMEN:  Soft, nondistended, nontender; no hepatosplenomegaly; bowel sounds 

are present in all four quadrants.


EXTREMITIES: No clubbing, cyanosis, or edema.


SKIN:  Normal; no rash; no jaundice.


CNS:  No focal deficits; alert and oriented times three.


 (Marion Olea)





Assessment and Plan


Plan


ASSESSMENT


- dysphagia - poss stricture, has hx of this. coughing and regurgitating with 

liquids and solids, cannot swallow secretions.


     had EGD and dilatation 7/2017 found gastritis, schatzki ring, spastic 

pylorus poss candida, pooling secretions pharynx, hiatal hernia. 


     Had 2nd dilatation 10/2017.     on pradaxa but has not had it in 4 days


s/p EGD found schatzki ring, s/p dilatation today





PLAN


- liquid diet, advance as tolerated


- had EGD with dilatation today


- ok to d/c from GI standpoint when tolerating diet


pt seen by myself and dr Harris and this note is on his behalf


 (Marion Olea)


Physician Comments


Seen and examined, plan as above.


Thank you for the consult.


 (Alyssa Harris MD)











Marion Olea Mar 8, 2018 12:16


Alyssa Harris MD Mar 8, 2018 12:47

## 2018-03-08 NOTE — HHI.PR
Subjective


Remarks


Pt reports that for the last two days he had difficulty taking some pills and 

felt like they


 became stuck and since then he has not been tolerating his secretions and has 

not


 been able to eat or drink anything. 


His wife also reports that the patient has seemed more confused the last few 

days and the


 family is concerned about a possible UTI.





Objective


Vitals





Vital Signs








  Date Time  Temp Pulse Resp B/P (MAP) Pulse Ox O2 Delivery O2 Flow Rate FiO2


 


3/8/18 07:42 98.4 71 20 137/72 (93) 95   


 


3/8/18 05:25 98.5 78 16 117/79 (92) 94   


 


3/8/18 01:17 97.5 76 16 125/84 (98) 92   


 


3/7/18 16:33 97.6 85 16 116/74 (88) 96   








Result Diagram:  


3/7/18 1730                                                                    

            3/7/18 1730





Other Results





 Laboratory Tests








Test


  3/7/18


17:30


 


White Blood Count 7.9 TH/MM3 


 


Red Blood Count 4.12 MIL/MM3 


 


Hemoglobin 13.6 GM/DL 


 


Hematocrit 39.0 % 


 


Mean Corpuscular Volume 94.7 FL 


 


Mean Corpuscular Hemoglobin 33.0 PG 


 


Mean Corpuscular Hemoglobin


Concent 34.9 % 


 


 


Red Cell Distribution Width 14.6 % 


 


Platelet Count 177 TH/MM3 


 


Mean Platelet Volume 9.7 FL 


 


Neutrophils (%) (Auto) 70.7 % 


 


Lymphocytes (%) (Auto) 17.2 % 


 


Monocytes (%) (Auto) 10.8 % 


 


Eosinophils (%) (Auto) 0.7 % 


 


Basophils (%) (Auto) 0.6 % 


 


Neutrophils # (Auto) 5.6 TH/MM3 


 


Lymphocytes # (Auto) 1.4 TH/MM3 


 


Monocytes # (Auto) 0.9 TH/MM3 


 


Eosinophils # (Auto) 0.1 TH/MM3 


 


Basophils # (Auto) 0.0 TH/MM3 


 


CBC Comment DIFF FINAL 


 


Differential Comment  


 


Prothrombin Time 11.4 SEC 


 


Prothromb Time International


Ratio 1.1 RATIO 


 


 


Activated Partial


Thromboplast Time 32.0 SEC 


 


 


Blood Urea Nitrogen 13 MG/DL 


 


Creatinine 1.03 MG/DL 


 


Random Glucose 107 MG/DL 


 


Total Protein 7.1 GM/DL 


 


Albumin 3.5 GM/DL 


 


Calcium Level 8.8 MG/DL 


 


Alkaline Phosphatase 86 U/L 


 


Aspartate Amino Transf


(AST/SGOT) 14 U/L 


 


 


Alanine Aminotransferase


(ALT/SGPT) 7 U/L 


 


 


Total Bilirubin 1.0 MG/DL 


 


Sodium Level 141 MEQ/L 


 


Potassium Level 4.4 MEQ/L 


 


Chloride Level 108 MEQ/L 


 


Carbon Dioxide Level 24.4 MEQ/L 


 


Anion Gap 9 MEQ/L 


 


Estimat Glomerular Filtration


Rate 68 ML/MIN 


 








Imaging





Last 24 hours Impressions








Chest X-Ray 3/7/18 0000 Signed





Impressions: 





 Service Date/Time:  Wednesday, March 7, 2018 17:08 - CONCLUSION: No acute 





 disease.       Jassi Godwin Jr., MD 








Objective Remarks


General: NAD, Awake and alert


Chest: CTA


Cardiac: Regular


Abd: regular


Abd: +BS, soft ND/NT


Ext: No edema





A/P


Problem List:  


(1) Dysphagia


ICD Codes:  R13.10 - Dysphagia, unspecified


Status:  Acute


Plan:  


- Pt is an 86 y/o male with hx of aspiration, esophageal stricture who 

previously has undergone EGD with dilation several times


 in the last 2 years, last one in July 2017. 


- He presented to the ED with complaints of dysphagia and difficulty with 

tolerating his secretions for the last 2 days after


 taking some pills and feeling as if the pills became stuck in the mid 

esophagus. 


- CXR at admission was negative. 


- GI has been consulted and case was discussed last night with Dr. Harris


- Pt planned for EGD with possible dilation today


- NPO 


- IV PPI


- IVF


- Supportive care





(2) Afib


ICD Codes:  I48.91 - Unspecified atrial fibrillation


Plan:  


- Pts home meds Atenolol po daily was continued but pt not tolerating 

secretions currently


- Pts Pradaxa was held last night


- HR stable on telemetry, pt in NSR currently





(3) Parkinson disease


ICD Codes:  G20 - Parkinson's disease


Plan:  


- Pts home meds continued but currently not tolerating secretions


- Will resume after EGD and once able to swallow without any s/s of aspiration


- Check UA as pts family feels that he may be somewhat more confused recently





Assessment and Plan


Patient examined.


Assessment and plan formulated with Sara Roa PA-C.


I agree with the above.


dysphagia. esophageal stricture. dilation today.


still with alot of oral secretions. no respiratory distress.


advance diet slowly. pt spitting up small amt blood.


ambulate. updated family.











Sara Roa Mar 8, 2018 08:58


Tan Gutierrez MD Mar 8, 2018 14:22

## 2018-03-09 VITALS
RESPIRATION RATE: 18 BRPM | DIASTOLIC BLOOD PRESSURE: 79 MMHG | TEMPERATURE: 97.7 F | HEART RATE: 84 BPM | SYSTOLIC BLOOD PRESSURE: 132 MMHG | OXYGEN SATURATION: 93 %

## 2018-03-09 VITALS — HEART RATE: 74 BPM

## 2018-03-09 VITALS
DIASTOLIC BLOOD PRESSURE: 65 MMHG | HEART RATE: 68 BPM | RESPIRATION RATE: 19 BRPM | TEMPERATURE: 96.3 F | OXYGEN SATURATION: 95 % | SYSTOLIC BLOOD PRESSURE: 115 MMHG

## 2018-03-09 VITALS
HEART RATE: 74 BPM | DIASTOLIC BLOOD PRESSURE: 57 MMHG | OXYGEN SATURATION: 93 % | TEMPERATURE: 96.8 F | SYSTOLIC BLOOD PRESSURE: 113 MMHG | RESPIRATION RATE: 17 BRPM

## 2018-03-09 VITALS
HEART RATE: 77 BPM | RESPIRATION RATE: 18 BRPM | OXYGEN SATURATION: 93 % | DIASTOLIC BLOOD PRESSURE: 68 MMHG | TEMPERATURE: 96.8 F | SYSTOLIC BLOOD PRESSURE: 115 MMHG

## 2018-03-09 VITALS — HEART RATE: 78 BPM

## 2018-03-09 VITALS
SYSTOLIC BLOOD PRESSURE: 133 MMHG | RESPIRATION RATE: 20 BRPM | HEART RATE: 78 BPM | OXYGEN SATURATION: 96 % | DIASTOLIC BLOOD PRESSURE: 83 MMHG | TEMPERATURE: 98.1 F

## 2018-03-09 VITALS
DIASTOLIC BLOOD PRESSURE: 73 MMHG | TEMPERATURE: 97.4 F | RESPIRATION RATE: 12 BRPM | SYSTOLIC BLOOD PRESSURE: 114 MMHG | HEART RATE: 66 BPM | OXYGEN SATURATION: 93 %

## 2018-03-09 VITALS — HEART RATE: 75 BPM

## 2018-03-09 LAB
BASOPHILS # BLD AUTO: 0 TH/MM3 (ref 0–0.2)
BASOPHILS NFR BLD: 0.3 % (ref 0–2)
BUN SERPL-MCNC: 10 MG/DL (ref 7–18)
CALCIUM SERPL-MCNC: 9 MG/DL (ref 8.5–10.1)
CHLORIDE SERPL-SCNC: 105 MEQ/L (ref 98–107)
CREAT SERPL-MCNC: 0.74 MG/DL (ref 0.6–1.3)
EOSINOPHIL # BLD: 0.1 TH/MM3 (ref 0–0.4)
EOSINOPHIL NFR BLD: 1.5 % (ref 0–4)
ERYTHROCYTE [DISTWIDTH] IN BLOOD BY AUTOMATED COUNT: 14.3 % (ref 11.6–17.2)
GFR SERPLBLD BASED ON 1.73 SQ M-ARVRAT: 100 ML/MIN (ref 89–?)
GLUCOSE SERPL-MCNC: 83 MG/DL (ref 74–106)
HCO3 BLD-SCNC: 23.4 MEQ/L (ref 21–32)
HCT VFR BLD CALC: 38.3 % (ref 39–51)
HGB BLD-MCNC: 13.6 GM/DL (ref 13–17)
LYMPHOCYTES # BLD AUTO: 1.2 TH/MM3 (ref 1–4.8)
LYMPHOCYTES NFR BLD AUTO: 15.1 % (ref 9–44)
MAGNESIUM SERPL-MCNC: 1.8 MG/DL (ref 1.5–2.5)
MCH RBC QN AUTO: 33.6 PG (ref 27–34)
MCHC RBC AUTO-ENTMCNC: 35.5 % (ref 32–36)
MCV RBC AUTO: 94.7 FL (ref 80–100)
MONOCYTE #: 0.8 TH/MM3 (ref 0–0.9)
MONOCYTES NFR BLD: 10.7 % (ref 0–8)
NEUTROPHILS # BLD AUTO: 5.7 TH/MM3 (ref 1.8–7.7)
NEUTROPHILS NFR BLD AUTO: 72.4 % (ref 16–70)
PLATELET # BLD: 154 TH/MM3 (ref 150–450)
PMV BLD AUTO: 9.3 FL (ref 7–11)
RBC # BLD AUTO: 4.04 MIL/MM3 (ref 4.5–5.9)
SODIUM SERPL-SCNC: 137 MEQ/L (ref 136–145)
WBC # BLD AUTO: 7.8 TH/MM3 (ref 4–11)

## 2018-03-09 RX ADMIN — HYOSCYAMINE SULFATE SCH MG: 0.12 SOLUTION/ DROPS ORAL at 20:29

## 2018-03-09 RX ADMIN — MEMANTINE HYDROCHLORIDE SCH MG: 10 TABLET ORAL at 20:28

## 2018-03-09 RX ADMIN — Medication SCH ML: at 10:40

## 2018-03-09 RX ADMIN — THIAMINE HYDROCHLORIDE SCH MLS/HR: 100 INJECTION, SOLUTION INTRAMUSCULAR; INTRAVENOUS at 03:37

## 2018-03-09 RX ADMIN — MEMANTINE HYDROCHLORIDE SCH MG: 10 TABLET ORAL at 10:39

## 2018-03-09 RX ADMIN — PANTOPRAZOLE SODIUM SCH MG: 40 INJECTION, POWDER, FOR SOLUTION INTRAVENOUS at 20:28

## 2018-03-09 RX ADMIN — STANDARDIZED SENNA CONCENTRATE AND DOCUSATE SODIUM SCH TAB: 8.6; 5 TABLET, FILM COATED ORAL at 20:28

## 2018-03-09 RX ADMIN — HYOSCYAMINE SULFATE SCH MG: 0.12 SOLUTION/ DROPS ORAL at 14:19

## 2018-03-09 RX ADMIN — CARBIDOPA AND LEVODOPA SCH TAB: 25; 250 TABLET ORAL at 14:20

## 2018-03-09 RX ADMIN — ATENOLOL SCH MG: 25 TABLET ORAL at 10:39

## 2018-03-09 RX ADMIN — CARBIDOPA AND LEVODOPA SCH TAB: 25; 250 TABLET ORAL at 10:40

## 2018-03-09 RX ADMIN — STANDARDIZED SENNA CONCENTRATE AND DOCUSATE SODIUM SCH TAB: 8.6; 5 TABLET, FILM COATED ORAL at 10:39

## 2018-03-09 RX ADMIN — CARBIDOPA AND LEVODOPA SCH TAB: 25; 250 TABLET ORAL at 18:35

## 2018-03-09 RX ADMIN — VITAMIN D, TAB 1000IU (100/BT) SCH UNITS: 25 TAB at 10:40

## 2018-03-09 RX ADMIN — PANTOPRAZOLE SODIUM SCH MG: 40 INJECTION, POWDER, FOR SOLUTION INTRAVENOUS at 08:24

## 2018-03-09 RX ADMIN — Medication SCH ML: at 20:40

## 2018-03-09 RX ADMIN — HYOSCYAMINE SULFATE SCH MG: 0.12 SOLUTION/ DROPS ORAL at 17:13

## 2018-03-09 RX ADMIN — HYOSCYAMINE SULFATE SCH MG: 0.12 SOLUTION/ DROPS ORAL at 23:43

## 2018-03-09 NOTE — HHI.GIFU
Subjective


Remarks


resting in the bed


family in


Patient constantly spitting saliva/mucous in container. 


Denies any nausea, Abdominal pain 


pale 


 (Suly Casarez)





Objective


Vitals I&O





Vital Signs








  Date Time  Temp Pulse Resp B/P (MAP) Pulse Ox O2 Delivery O2 Flow Rate FiO2


 


3/9/18 08:50 98.1 78 20 133/83 (100) 96   


 


3/9/18 04:31  75      


 


3/9/18 03:04 97.7 84 18 132/79 (96) 93   


 


3/8/18 23:55  79      


 


3/8/18 21:22 98.2 83 18 136/74 (94) 93   


 


3/8/18 16:58 98.1 80 20 134/89 (104) 96   














I/O      


 


 3/8/18 3/8/18 3/8/18 3/9/18 3/9/18 3/9/18





 07:00 15:00 23:00 07:00 15:00 23:00


 


Intake Total 1010 ml 1350 ml    


 


Balance 1010 ml 1350 ml    


 


      


 


Intake Oral 210 ml     


 


IV Total 800 ml 950 ml    


 


Other  400 ml    


 


# Voids    2  


 


# Bowel Movements    0  








Laboratory





Laboratory Tests








Test


  3/8/18


13:38 3/9/18


08:49


 


Urine Color YELLOW  


 


Urine Turbidity CLEAR  


 


Urine pH 5.5  


 


Urine Specific Gravity 1.012  


 


Urine Protein NEG  


 


Urine Glucose (UA) NEG  


 


Urine Ketones TRACE  


 


Urine Occult Blood NEG  


 


Urine Nitrite NEG  


 


Urine Bilirubin NEG  


 


Urine Urobilinogen LESS THAN 2.0  


 


Urine Leukocyte Esterase NEG  


 


Urine RBC 1  


 


Urine WBC LESS THAN 1  


 


Urine Mucus FEW  


 


Microscopic Urinalysis Comment


  CULT NOT


INDICATED 


 


 


White Blood Count  7.8 


 


Red Blood Count  4.04 


 


Hemoglobin  13.6 


 


Hematocrit  38.3 


 


Mean Corpuscular Volume  94.7 


 


Mean Corpuscular Hemoglobin  33.6 


 


Mean Corpuscular Hemoglobin


Concent 


  35.5 


 


 


Red Cell Distribution Width  14.3 


 


Platelet Count  154 


 


Mean Platelet Volume  9.3 


 


Neutrophils (%) (Auto)  72.4 


 


Lymphocytes (%) (Auto)  15.1 


 


Monocytes (%) (Auto)  10.7 


 


Eosinophils (%) (Auto)  1.5 


 


Basophils (%) (Auto)  0.3 


 


Neutrophils # (Auto)  5.7 


 


Lymphocytes # (Auto)  1.2 


 


Monocytes # (Auto)  0.8 


 


Eosinophils # (Auto)  0.1 


 


Basophils # (Auto)  0.0 


 


CBC Comment  DIFF FINAL 


 


Differential Comment   


 


Blood Urea Nitrogen  10 


 


Creatinine  0.74 


 


Random Glucose  83 


 


Calcium Level  9.0 


 


Magnesium Level  1.8 


 


Sodium Level  137 


 


Potassium Level  3.6 


 


Chloride Level  105 


 


Carbon Dioxide Level  23.4 


 


Anion Gap  9 


 


Estimat Glomerular Filtration


Rate 


  100 


 








Imaging





Last Impressions








Chest X-Ray 3/8/18 0000 Signed





Impressions: 





 Service Date/Time:  Thursday, March 8, 2018 12:26 - CONCLUSION:  1. Left 

basilar 





 atelectasis.     Rober Devlin MD 








Physical Exam


HEENT: Pupils round and reactive to light; normocephalic; atraumatic; no 

jaundice.pale  Oral secretions, spitting in basin. 


NECK: Neck is supple, obese


CHEST: Diminished BS bases, Lt > Rt. 


CARDIAC:  Regular rate and rhythm


ABDOMEN: large, round, Soft, mild bloating, nontender; no hepatosplenomegaly; 

bowel sounds are present in all four quadrants.


EXTREMITIES: No clubbing, cyanosis, or edema.


SKIN:  pale, no rash; no jaundice.


CNS: dysphagia, decreased ability to swallow; alert and oriented times three.


 (Suly Casarez)





Assessment and Plan


Plan


ASSESSMENT


- dysphagia - poss stricture, has hx of this. coughing and regurgitating with 

liquids and solids, cannot swallow secretions.


     had EGD and dilatation 7/2017 found gastritis, schatzki ring, spastic 

pylorus poss candida, pooling secretions pharynx, hiatal hernia. 


     Had 2nd dilatation 10/2017.     on pradaxa but has not had it in 4 days


s/p EGD 03/08/18 2 dilatations,  found schatzki ring, s/p dilatation today, no 

complications.  Distal esophagus Schatzki ring, dilated up to 15 mm, balloon 

held for 30 seconds.  No change in stricture.  redilated by 16 mm minor mucosal 

rent. 


Before discharge family requesting to speak to Dr. Harris further 

recommendations of care. 


 Patient's main issue appears to be more secretion control, swallow saliva/

mucous.  Working with speech therapist today





PLAN


- Speech therapy, recommend nectar thick liquids, chopped meats very small 

pieces, mechanical soft


- Spoke with family and patient, no milk or feeding products if increased mucous

, or cant clear after swallow. 


- Dilatations as needed


- Hyoscyamine liquid 0.125 mg by mouth every 4 hours, hold if mouth becomes to 

dry. 


- Supportive care


- Instructions to patient and family sitting patient straight up to eat and 

leaning forward for swallow


pt seen by myself and dr Harris and this note is on his behalf


 (Suly Casarez)


Physician Comments


As above, will follow up with you.


Check speech eval.


 (Alyssa Hraris MD)











Suly Casarez Mar 9, 2018 12:40


Alyssa Harris MD Mar 9, 2018 15:45

## 2018-03-09 NOTE — HHI.PR
Subjective


Remarks


Pts daughter at bedside reports that the pt is still having issues with his 

secretions and coughing up 


 a lot of phlegm. 


He did eat some grits this morning without much difficulty initially but then 

seemed to have some 


 coughing and bringing up a lot of sputum


Then he tried to drink some ensure a bit later and had difficulty initiating a 

swallow





Objective


Vitals





Vital Signs








  Date Time  Temp Pulse Resp B/P (MAP) Pulse Ox O2 Delivery O2 Flow Rate FiO2


 


3/9/18 08:50 98.1 78 20 133/83 (100) 96   


 


3/9/18 04:31  75      


 


3/9/18 03:04 97.7 84 18 132/79 (96) 93   


 


3/8/18 23:55  79      


 


3/8/18 21:22 98.2 83 18 136/74 (94) 93   


 


3/8/18 16:58 98.1 80 20 134/89 (104) 96   


 


3/8/18 12:30  80 18  95   


 


3/8/18 12:06 97.0 76 18 120/75 (90) 90   








Result Diagram:  


3/9/18 0849                                                                    

            3/9/18 0849





Other Results





 Laboratory Tests








Test


  3/7/18


17:30 3/8/18


13:38 3/9/18


08:49


 


White Blood Count 7.9 TH/MM3   7.8 TH/MM3 


 


Red Blood Count 4.12 MIL/MM3   4.04 MIL/MM3 


 


Hemoglobin 13.6 GM/DL   13.6 GM/DL 


 


Hematocrit 39.0 %   38.3 % 


 


Mean Corpuscular Volume 94.7 FL   94.7 FL 


 


Mean Corpuscular Hemoglobin 33.0 PG   33.6 PG 


 


Mean Corpuscular Hemoglobin


Concent 34.9 % 


  


  35.5 % 


 


 


Red Cell Distribution Width 14.6 %   14.3 % 


 


Platelet Count 177 TH/MM3   154 TH/MM3 


 


Mean Platelet Volume 9.7 FL   9.3 FL 


 


Neutrophils (%) (Auto) 70.7 %   72.4 % 


 


Lymphocytes (%) (Auto) 17.2 %   15.1 % 


 


Monocytes (%) (Auto) 10.8 %   10.7 % 


 


Eosinophils (%) (Auto) 0.7 %   1.5 % 


 


Basophils (%) (Auto) 0.6 %   0.3 % 


 


Neutrophils # (Auto) 5.6 TH/MM3   5.7 TH/MM3 


 


Lymphocytes # (Auto) 1.4 TH/MM3   1.2 TH/MM3 


 


Monocytes # (Auto) 0.9 TH/MM3   0.8 TH/MM3 


 


Eosinophils # (Auto) 0.1 TH/MM3   0.1 TH/MM3 


 


Basophils # (Auto) 0.0 TH/MM3   0.0 TH/MM3 


 


CBC Comment DIFF FINAL   DIFF FINAL 


 


Differential Comment     


 


Prothrombin Time 11.4 SEC   


 


Prothromb Time International


Ratio 1.1 RATIO 


  


  


 


 


Activated Partial


Thromboplast Time 32.0 SEC 


  


  


 


 


Blood Urea Nitrogen 13 MG/DL   10 MG/DL 


 


Creatinine 1.03 MG/DL   0.74 MG/DL 


 


Random Glucose 107 MG/DL   83 MG/DL 


 


Total Protein 7.1 GM/DL   


 


Albumin 3.5 GM/DL   


 


Calcium Level 8.8 MG/DL   9.0 MG/DL 


 


Alkaline Phosphatase 86 U/L   


 


Aspartate Amino Transf


(AST/SGOT) 14 U/L 


  


  


 


 


Alanine Aminotransferase


(ALT/SGPT) 7 U/L 


  


  


 


 


Total Bilirubin 1.0 MG/DL   


 


Sodium Level 141 MEQ/L   137 MEQ/L 


 


Potassium Level 4.4 MEQ/L   3.6 MEQ/L 


 


Chloride Level 108 MEQ/L   105 MEQ/L 


 


Carbon Dioxide Level 24.4 MEQ/L   23.4 MEQ/L 


 


Anion Gap 9 MEQ/L   9 MEQ/L 


 


Estimat Glomerular Filtration


Rate 68 ML/MIN 


  


  100 ML/MIN 


 


 


Urine Color  YELLOW  


 


Urine Turbidity  CLEAR  


 


Urine pH  5.5  


 


Urine Specific Gravity  1.012  


 


Urine Protein  NEG mg/dL  


 


Urine Glucose (UA)  NEG mg/dL  


 


Urine Ketones  TRACE mg/dL  


 


Urine Occult Blood  NEG  


 


Urine Nitrite  NEG  


 


Urine Bilirubin  NEG  


 


Urine Urobilinogen


  


  LESS THAN 2.0


MG/DL 


 


 


Urine Leukocyte Esterase  NEG  


 


Urine RBC  1 /hpf  


 


Urine WBC


  


  LESS THAN 1


/hpf 


 


 


Urine Mucus  FEW /lpf  


 


Microscopic Urinalysis Comment


  


  CULT NOT


INDICATED 


 


 


Magnesium Level   1.8 MG/DL 








Imaging





Last 24 hours Impressions








Chest X-Ray 3/7/18 0000 Signed





Impressions: 





 Service Date/Time:  Wednesday, March 7, 2018 17:08 - CONCLUSION: No acute 





 disease.       Jassi Godwin Jr., MD 








Objective Remarks


General: NAD, Awake and alert


Chest: CTA


Cardiac: Regular


Abd: regular


Abd: +BS, soft ND/NT


Ext: No edema





A/P


Problem List:  


(1) Dysphagia


ICD Codes:  R13.10 - Dysphagia, unspecified


Status:  Acute


Plan:  


- Pt is an 88 y/o male with hx of aspiration, esophageal stricture who 

previously has undergone EGD with dilation several times


 in the last 2 years, last one in July 2017. 


- He presented to the ED with complaints of dysphagia and difficulty with 

tolerating his secretions for the last 2 days after


 taking some pills and feeling as if the pills became stuck in the mid 

esophagus. 


- CXR at admission was negative. 


- GI has been consulted and case was discussed last night with Dr. Harris


- Pt underwent EGD with dilation on 3/8 -->  Schatzki ring was found in the 

distal esophagus and using a TTS-balloon the stricture


 was dilated up to 15mm; The balloon was held inflated for 30 seconds; 

Following this dilation, there was no change in the appearance


of the stricture, followed by 16 mm with minor mucosal rent.


- ST evaluation is pending. 


- Pt is on full liquid diet with orders to advance as tolerated per GI


- Pt still having some issues with swallowing this morning.


- IV PPI


- Supportive care





(2) Afib


ICD Codes:  I48.91 - Unspecified atrial fibrillation


Plan:  


- Pts home meds Atenolol po daily was continued but pt not tolerating oral 

intake


- Pts Pradaxa was held last night, family reports that the pt has not received 

is Pradaxa for 2 days prior to admission


- HR stable on telemetry, pt in NSR currently





(3) Parkinson disease


ICD Codes:  G20 - Parkinson's disease


Plan:  


- Pts home meds continued 


- UA checked per family request as they felt that he may be somewhat more 

confused recently


- UA was negative. 





Assessment and Plan


Patient examined.


Assessment and plan formulated with Sara Roa PA-C.


I agree with the above.


severe esophageal stricture. 


not responding well to dilation


modify diet/position. try bid ppi. try levsin trial as pt


and family are frustrated and may not be any further procedures


that can be done. check with GI for any further intervention.


peg would not solve the severe secretions building up even when


not eating.











Sara Roa Mar 9, 2018 09:59


Tan Gutierrez MD Mar 9, 2018 12:15

## 2018-03-10 VITALS
OXYGEN SATURATION: 94 % | TEMPERATURE: 96.9 F | RESPIRATION RATE: 17 BRPM | HEART RATE: 83 BPM | SYSTOLIC BLOOD PRESSURE: 149 MMHG | DIASTOLIC BLOOD PRESSURE: 80 MMHG

## 2018-03-10 VITALS
TEMPERATURE: 96.7 F | DIASTOLIC BLOOD PRESSURE: 77 MMHG | SYSTOLIC BLOOD PRESSURE: 128 MMHG | HEART RATE: 77 BPM | OXYGEN SATURATION: 96 % | RESPIRATION RATE: 18 BRPM

## 2018-03-10 VITALS
OXYGEN SATURATION: 95 % | SYSTOLIC BLOOD PRESSURE: 108 MMHG | DIASTOLIC BLOOD PRESSURE: 70 MMHG | TEMPERATURE: 96.8 F | HEART RATE: 73 BPM | RESPIRATION RATE: 17 BRPM

## 2018-03-10 VITALS — HEART RATE: 82 BPM

## 2018-03-10 RX ADMIN — ATENOLOL SCH MG: 25 TABLET ORAL at 08:59

## 2018-03-10 RX ADMIN — CARBIDOPA AND LEVODOPA SCH TAB: 25; 250 TABLET ORAL at 12:15

## 2018-03-10 RX ADMIN — HYOSCYAMINE SULFATE SCH MG: 0.12 SOLUTION/ DROPS ORAL at 08:58

## 2018-03-10 RX ADMIN — HYOSCYAMINE SULFATE SCH MG: 0.12 SOLUTION/ DROPS ORAL at 11:48

## 2018-03-10 RX ADMIN — MEMANTINE HYDROCHLORIDE SCH MG: 10 TABLET ORAL at 08:59

## 2018-03-10 RX ADMIN — CARBIDOPA AND LEVODOPA SCH TAB: 25; 250 TABLET ORAL at 08:59

## 2018-03-10 RX ADMIN — STANDARDIZED SENNA CONCENTRATE AND DOCUSATE SODIUM SCH TAB: 8.6; 5 TABLET, FILM COATED ORAL at 08:59

## 2018-03-10 RX ADMIN — VITAMIN D, TAB 1000IU (100/BT) SCH UNITS: 25 TAB at 09:00

## 2018-03-10 RX ADMIN — PANTOPRAZOLE SODIUM SCH MG: 40 INJECTION, POWDER, FOR SOLUTION INTRAVENOUS at 08:58

## 2018-03-10 RX ADMIN — Medication SCH ML: at 09:00

## 2018-03-10 RX ADMIN — HYOSCYAMINE SULFATE SCH MG: 0.12 SOLUTION/ DROPS ORAL at 04:00

## 2018-03-10 NOTE — HHI.GIFU
Subjective


Remarks


pt is resting in  bed accompanied by family,  doing good tolerating diet okay, 

no dysphagia reported


 (Christine Roth)





Objective


Vitals I&O





Vital Signs








  Date Time  Temp Pulse Resp B/P (MAP) Pulse Ox O2 Delivery O2 Flow Rate FiO2


 


3/10/18 08:00 96.7 77 18 128/77 (94) 96   


 


3/10/18 04:45 96.9 83 17 149/80 (103) 94   


 


3/10/18 04:00  82      


 


3/9/18 23:54  78      


 


3/9/18 23:49 96.8 74 17 113/57 (75) 93   


 


3/9/18 20:15 96.8 77 18 115/68 (84) 93   


 


3/9/18 20:00  74      


 


3/9/18 15:51 96.3 68 19 115/65 (82) 95   


 


3/9/18 12:53 97.4 66 12 114/73 (87) 93   














I/O      


 


 3/9/18 3/9/18 3/9/18 3/10/18 3/10/18 3/10/18





 07:00 15:00 23:00 07:00 15:00 23:00


 


Intake Total   240 ml 240 ml  


 


Balance   240 ml 240 ml  


 


      


 


Intake Oral   240 ml 240 ml  


 


# Voids 2  1 2  


 


# Bowel Movements 0  0 1  








Physical Exam


HEENT: Pupils round and reactive to light; normocephalic; atraumatic; no 

jaundice.pale  Oral secretions, spitting in basin. 


NECK: Neck is supple, obese


CHEST: Diminished BS bases, 


CARDIAC:  Regular rate and rhythm


ABDOMEN: large, round, Soft, mild bloating, nontender; no hepatosplenomegaly; 

bowel sounds are present in all four quadrants.


EXTREMITIES: No clubbing, cyanosis, or edema.


SKIN:  pale, no rash; no jaundice.


CNS:  alert and oriented times three.


 (Christine Roth)





Assessment and Plan


Plan


ASSESSMENT


- dysphagia - poss stricture, has hx of this. coughing and regurgitating with 

liquids and solids, cannot swallow secretions.


     had EGD and dilatation 7/2017 found gastritis, schatzki ring, spastic 

pylorus poss candida, pooling secretions pharynx, hiatal hernia. 


     Had 2nd dilatation 10/2017.     on pradaxa but has not had it in 4 days


s/p EGD 03/08/18 2 dilatations,  found schatzki ring, s/p dilatation today, no 

complications.  Distal esophagus Schatzki ring, dilated up to 15 mm, balloon 

held for 30 seconds.  No change in stricture.  redilated by 16 mm minor mucosal 

rent. 


s/p ST eval who recommended mechanical soft and thin liquid, pt seems to be 

good today


 no dysphagia 





PLAN


- ST eval who recommended mechanical soft and thin liquid


- Dilatations as needed


- GI will sign off 


- Instructions to patient and family sitting patient straight up to eat and 

leaning forward for swallow


pt seen by myself and dr Harris and this note is on his behalf


 (Christine Roth)


Physician Comments


Agree with above assessment and plan.


Dilatation PRN and follow up at [facility] as outpatient.


 (Alyssa Harris MD)











Christine Roth Mar 10, 2018 10:34


Alyssa Harris MD Mar 10, 2018 12:24

## 2018-03-10 NOTE — HHI.DS
Discharge Summary


Admission Date


Mar 7, 2018 at 18:36


Discharge Date:  Mar 10, 2018


Admitting Diagnosis


dysphagia





(1) Esophageal stricture


Diagnosis:  Principal


ICD Codes:  K22.2 - Esophageal stricture


Status:  Chronic


(2) Dysphagia


Diagnosis:  Principal


ICD Codes:  R13.10 - Dysphagia, unspecified


Status:  Acute


(3) Afib


Diagnosis:  Secondary


ICD Codes:  I48.91 - Unspecified atrial fibrillation


(4) Parkinson disease


Diagnosis:  Secondary


ICD Codes:  G20 - Parkinson's disease


Brief History





87-year-old male presents to the emergency department  for evaluation of 

possible esophageal stricture.  Patient states that since yesterday morning, he 

has been unable to keep any food or liquid down.  He will swallow and cough it 

right back up.  According to the family, he has been having trouble for the 

past couple weeks, but since yesterday morning has not been able to eat or 

drink anything.  He denies any other symptoms or complaints.  No headache.  No 

fevers or chills.  No chest pain or shortness breath.  No abdominal pain.  No 

nausea, vomiting, diarrhea.  According to family, last time he had esophageal 

stricture they did a barium swallow and he wound up with pneumonia.  ER 

discussed case with GI and they want admit for endoscopy in am with hx 

esophageal stricture.


CBC/BMP:  


3/9/18 0849                                                                    

            3/9/18 0849





Significant Findings





Laboratory Tests








Test


  3/7/18


17:30 3/8/18


13:38 3/9/18


08:49


 


Red Blood Count


  4.12 MIL/MM3


(4.50-5.90) 


  4.04 MIL/MM3


(4.50-5.90)


 


Neutrophils (%) (Auto)


  70.7 %


(16.0-70.0) 


  72.4 %


(16.0-70.0)


 


Monocytes (%) (Auto)


  10.8 %


(0.0-8.0) 


  10.7 %


(0.0-8.0)


 


Activated Partial


Thromboplast Time 32.0 SEC


(24.3-30.1) 


  


 


 


Random Glucose


  107 MG/DL


() 


  


 


 


Aspartate Amino Transf


(AST/SGOT) 14 U/L (15-37) 


  


  


 


 


Alanine Aminotransferase


(ALT/SGPT) 7 U/L (12-78) 


  


  


 


 


Chloride Level


  108 MEQ/L


() 


  


 


 


Estimat Glomerular Filtration


Rate 68 ML/MIN


(>89) 


  


 


 


Urine Ketones


  


  TRACE mg/dL


(NEG) 


 


 


Urine Mucus  FEW /lpf (OCC)  


 


Hematocrit


  


  


  38.3 %


(39.0-51.0)








Hospital Course





(1) Dysphagia





- Pt is an 86 y/o male with hx of aspiration, esophageal stricture who 

previously has undergone EGD with dilation several times


 in the last 2 years, last one in July 2017. 


- He presented to the ED with complaints of dysphagia and difficulty with 

tolerating his secretions for the last 2 days after


 taking some pills and feeling as if the pills became stuck in the mid 

esophagus. 


- CXR at admission was negative. 


- GI has been consulted and case was discussed  with Dr. Harris


- Pt underwent EGD with dilation on 3/8 -->  Schatzki ring was found in the 

distal esophagus and using a TTS-balloon the stricture


 was dilated up to 15mm; The balloon was held inflated for 30 seconds; 

Following this dilation, there was no change in the appearance


of the stricture, followed by 16 mm with minor mucosal rent.


- ST evaluation initially required nectar thick liquid but then subsequently 

passed the test. will need mechanical soft diet/chopped meats


and gravy..plus elevate head of bed  and no eating just before lying down. 

family requested reevaluation by GI due to persistent severe secretion


buildup. he will be on bid ppi and levsin which seems to help the secretions prn


-


(2) Afib


ICD Codes:  I48.91 - Unspecified atrial fibrillation


Plan:  


- Pts home meds Atenolol po daily was continued but pt not tolerating oral 

intake


- resume pradaxa


-


(3) Parkinson disease


ICD Codes:  G20 - Parkinson's disease


Pt Condition on Discharge:  Stable


Discharge Disposition:  Disch w/ Home Health Serv


Discharge Instructions


DIET: Follow Instructions for:  As Tolerated, No Restrictions


Speech Therapy-Diet Recommends:  Mechanical Soft, Chopped Meat w/Gravy


Additional Diet Instructions:  


mechanical soft. chopped meats and gravy.





don't lye down immediately after meals


Activities you can perform:  Regular-No Restrictions


Follow up Referrals:  


Gastroenterology - 2 Weeks with Amanda Nathan MD


PCP Follow-up - 1 Week with don white





New Medications:  


Hyoscyamine Liq Drops (Hyosyne Liq Drops) 0.125 Mg/Ml Soln


0.125 MG PO Q4HR PRN for INCREASED SECRETIONS for 30 Days, DROP 3 Refills





 


Continued Medications:  


Amlodipine (Norvasc) 5 Mg Tab


5 MG PO BID for Blood Pressure Management, #30 TAB 0 Refills





Atenolol (Atenolol) 25 Mg Tab


25 MG PO DAILY for Blood Pressure Management, #30 TAB





Atorvastatin (Lipitor) 40 Mg Tab


40 MG PO HS for Cholesterol Management, #30 TAB 0 Refills





Carbidopa-Levodopa (Carbidopa-Levodopa)  Mg Tab


0.5 TAB PO TID for Parkinson Disease Mgmt, #90 TAB 0 Refills


taken at 0800, 1300, 1800


Cholecalciferol (Vitamin D3) 1,000 Unit Tab


1000 UNITS PO DAILY for Nutritional Supplement, #1 BOTTLE 0 Refills





Dabigatran (Pradaxa) 150 Mg Cap


150 MG PO BID for Blood Clot Prevention, #0 CAP 0 Refills


resume on 11/4


Memantine (Memantine) 10 Mg Tab


10 MG PO BID for Alzheimer's Dementia, TAB 0 Refills





Pantoprazole (Pantoprazole) 40 Mg Tab


40 MG PO BID for Reflux, #30 TAB 0 Refills

















Tan Gutierrez MD Mar 10, 2018 10:55

## 2018-03-10 NOTE — HHI.DCPOC
Discharge Care Plan


Diagnosis:  


(1) Esophageal stricture


Goals to Promote Your Health


* To prevent worsening of your condition and complications


* To maintain your health at the optimal level


Directions to Meet Your Goals


*** Take your medications as prescribed


*** Follow your dietary instruction


*** Follow activity as directed








*** Keep your appointments as scheduled


*** Take your immunizations and boosters as scheduled


*** If your symptoms worsen call your PCP, if no PCP go to Urgent Care Center 

or Emergency Room***


*** Smoking is Dangerous to Your Health. Avoid second hand smoke***


***Call the 24-hour hour crisis hotline for domestic abuse at 1-394.668.4238***











Tan Gutierrez MD Mar 10, 2018 10:47

## 2018-03-10 NOTE — HHI.FF
Face to Face Verification


Diagnosis:  


(1) Esophageal stricture


(2) Parkinson disease


(3) Generalized weakness


(4) Dementia


Physical Therapy


Order:  Evaluate and Treat, Improve ambulation





Speech Therapy


Order: To Improve:  Swallowing





Home Health Nursing








Order: Medical education





 Signs/symptoms of disease process





 Medication education-adverse effect





 Nursing assessment with vital signs

















I have seen patient Jose Nagy Jr Radha on 3/10/18. My clinical findings 

support the need for the requested home health care services because:








 Limited ability to care for self














I certify that my clinical findings support that this patient is homebound 

because:








 Impaired cognitive ability/safety

















Tan Gutierrez MD Mar 10, 2018 10:48

## 2018-04-30 ENCOUNTER — HOSPITAL ENCOUNTER (INPATIENT)
Dept: HOSPITAL 17 - NEPC | Age: 83
LOS: 5 days | Discharge: NURSING, REHAB OR LONG-TERM CARE | DRG: 726 | End: 2018-05-05
Attending: HOSPITALIST | Admitting: HOSPITALIST
Payer: MEDICARE

## 2018-04-30 VITALS
RESPIRATION RATE: 20 BRPM | TEMPERATURE: 97.7 F | DIASTOLIC BLOOD PRESSURE: 78 MMHG | SYSTOLIC BLOOD PRESSURE: 153 MMHG | OXYGEN SATURATION: 94 % | HEART RATE: 64 BPM

## 2018-04-30 VITALS
DIASTOLIC BLOOD PRESSURE: 86 MMHG | SYSTOLIC BLOOD PRESSURE: 148 MMHG | HEART RATE: 72 BPM | OXYGEN SATURATION: 94 % | TEMPERATURE: 97.4 F | RESPIRATION RATE: 18 BRPM

## 2018-04-30 VITALS — OXYGEN SATURATION: 95 %

## 2018-04-30 VITALS — BODY MASS INDEX: 26.91 KG/M2 | HEIGHT: 71 IN | WEIGHT: 192.24 LBS

## 2018-04-30 DIAGNOSIS — R31.0: ICD-10-CM

## 2018-04-30 DIAGNOSIS — N18.2: ICD-10-CM

## 2018-04-30 DIAGNOSIS — I27.20: ICD-10-CM

## 2018-04-30 DIAGNOSIS — G20: ICD-10-CM

## 2018-04-30 DIAGNOSIS — F02.80: ICD-10-CM

## 2018-04-30 DIAGNOSIS — E78.5: ICD-10-CM

## 2018-04-30 DIAGNOSIS — K21.9: ICD-10-CM

## 2018-04-30 DIAGNOSIS — N40.1: Primary | ICD-10-CM

## 2018-04-30 DIAGNOSIS — I12.9: ICD-10-CM

## 2018-04-30 DIAGNOSIS — E11.22: ICD-10-CM

## 2018-04-30 DIAGNOSIS — N39.0: ICD-10-CM

## 2018-04-30 DIAGNOSIS — I48.91: ICD-10-CM

## 2018-04-30 DIAGNOSIS — Z87.891: ICD-10-CM

## 2018-04-30 DIAGNOSIS — N20.0: ICD-10-CM

## 2018-04-30 DIAGNOSIS — Z88.2: ICD-10-CM

## 2018-04-30 LAB
ALBUMIN SERPL-MCNC: 3.3 GM/DL (ref 3.4–5)
ALP SERPL-CCNC: 77 U/L (ref 45–117)
ALT SERPL-CCNC: 14 U/L (ref 12–78)
AST SERPL-CCNC: 21 U/L (ref 15–37)
BASOPHILS # BLD AUTO: 0 TH/MM3 (ref 0–0.2)
BASOPHILS NFR BLD: 0.6 % (ref 0–2)
BILIRUB SERPL-MCNC: 0.7 MG/DL (ref 0.2–1)
BUN SERPL-MCNC: 17 MG/DL (ref 7–18)
CALCIUM SERPL-MCNC: 8.4 MG/DL (ref 8.5–10.1)
CHLORIDE SERPL-SCNC: 111 MEQ/L (ref 98–107)
COLOR UR: (no result)
CREAT SERPL-MCNC: 1.09 MG/DL (ref 0.6–1.3)
EOSINOPHIL # BLD: 0.1 TH/MM3 (ref 0–0.4)
EOSINOPHIL NFR BLD: 1.8 % (ref 0–4)
ERYTHROCYTE [DISTWIDTH] IN BLOOD BY AUTOMATED COUNT: 14.3 % (ref 11.6–17.2)
GFR SERPLBLD BASED ON 1.73 SQ M-ARVRAT: 64 ML/MIN (ref 89–?)
GLUCOSE SERPL-MCNC: 114 MG/DL (ref 74–106)
GLUCOSE UR STRIP-MCNC: (no result) MG/DL
HCO3 BLD-SCNC: 23.5 MEQ/L (ref 21–32)
HCT VFR BLD CALC: 37.1 % (ref 39–51)
HGB BLD-MCNC: 12.8 GM/DL (ref 13–17)
HGB UR QL STRIP: (no result)
INR PPP: 1.2 RATIO
KETONES UR STRIP-MCNC: (no result) MG/DL
LYMPHOCYTES # BLD AUTO: 1.8 TH/MM3 (ref 1–4.8)
LYMPHOCYTES NFR BLD AUTO: 24.1 % (ref 9–44)
MCH RBC QN AUTO: 32.6 PG (ref 27–34)
MCHC RBC AUTO-ENTMCNC: 34.4 % (ref 32–36)
MCV RBC AUTO: 94.5 FL (ref 80–100)
MONOCYTE #: 0.9 TH/MM3 (ref 0–0.9)
MONOCYTES NFR BLD: 12.5 % (ref 0–8)
NEUTROPHILS # BLD AUTO: 4.5 TH/MM3 (ref 1.8–7.7)
NEUTROPHILS NFR BLD AUTO: 61 % (ref 16–70)
NITRITE UR QL STRIP: (no result)
PLATELET # BLD: 185 TH/MM3 (ref 150–450)
PMV BLD AUTO: 9.8 FL (ref 7–11)
PROT SERPL-MCNC: 6.4 GM/DL (ref 6.4–8.2)
PROTHROMBIN TIME: 12 SEC (ref 9.8–11.6)
RBC # BLD AUTO: 3.92 MIL/MM3 (ref 4.5–5.9)
SODIUM SERPL-SCNC: 142 MEQ/L (ref 136–145)
SP GR UR STRIP: 1.03 (ref 1–1.03)
URINE LEUKOCYTE ESTERASE: NEGATIVE
WBC # BLD AUTO: 7.4 TH/MM3 (ref 4–11)

## 2018-04-30 PROCEDURE — 74176 CT ABD & PELVIS W/O CONTRAST: CPT

## 2018-04-30 PROCEDURE — 85610 PROTHROMBIN TIME: CPT

## 2018-04-30 PROCEDURE — 85730 THROMBOPLASTIN TIME PARTIAL: CPT

## 2018-04-30 PROCEDURE — 80048 BASIC METABOLIC PNL TOTAL CA: CPT

## 2018-04-30 PROCEDURE — 80053 COMPREHEN METABOLIC PANEL: CPT

## 2018-04-30 PROCEDURE — 81001 URINALYSIS AUTO W/SCOPE: CPT

## 2018-04-30 PROCEDURE — 87086 URINE CULTURE/COLONY COUNT: CPT

## 2018-04-30 PROCEDURE — 85025 COMPLETE CBC W/AUTO DIFF WBC: CPT

## 2018-04-30 NOTE — RADRPT
EXAM DATE/TIME:  04/30/2018 21:00 

 

[facility] COMPARISON:     

No previous studies available for comparison.

 

 

INDICATIONS :     

Hematuria.

                  

 

ORAL CONTRAST:      

No oral contrast ingested.

                  

 

RADIATION DOSE:     

14.33 CTDIvol (mGy) 

 

 

MEDICAL HISTORY :     

Cardiovascular disease. Hypertension. 

 

SURGICAL HISTORY :      

Appendectomy. Prostatectomy.

 

ENCOUNTER:      

Initial

 

ACUITY:      

1 day

 

PAIN SCALE:      

4/10

 

LOCATION:       

Bilateral flank 

 

TECHNIQUE:     

Volumetric scanning of the abdomen and pelvis was performed.  Using automated exposure control and ad
justment of the mA and/or kV according to patient size, radiation dose was kept as low as reasonably 
achievable to obtain optimal diagnostic quality images.  DICOM format image data is available electro
nically for review and comparison.  

 

FINDINGS:     

Minimal basilar dependent atelectasis and scarring. Calcified granuloma left lower lobe.

 

No acute findings in the liver, spleen, adrenals or pancreas. Bilateral renal parapelvic cysts with n
onobstructing 6 mm calcification midpole right kidney.

 

There is no free fluid. No bowel obstruction. No adenopathy. Prostate is enlarged. Questionable depen
dent density within the bladder. Mild scoliosis.

 

CONCLUSION:     

1. Nonobstructing 6 mm calculus left kidney. Bilateral renal cysts mostly in the parapelvic region.

2. Enlarged prostate. Mild dependent density in the bladder could represent some bladder hemorrhage g
iven history of hematuria.

 

 

 

 aJck Borges MD on April 30, 2018 at 21:20           

Board Certified Radiologist.

 This report was verified electronically.

## 2018-04-30 NOTE — PD
HPI


Chief Complaint:  Bleeding


Time Seen by Provider:  19:59


Travel History


International Travel<30 days:  No


Contact w/Intl Traveler<30days:  No


Traveled to known affect area:  No





History of Present Illness


HPI


Patient comes emergency department with family complaining of bleeding from his 

penis that began around 10:00 this morning.  Patient's wife reports that his 

depends had a wet collection of blood this morning around 10:00 and then had 

some dribbling throughout the day had a second episode of large amount of wet 

blood this afternoon and family decided to bring patient to the emergency 

department for further treatment and evaluation.  Patient is on Pradaxa.  

Patient denies any pain with this or radiation of pain.  Denies any fever, 

chest pain, shortness of breath, abdominal pain, dizziness, lightheadedness, 

back pain, or change in bowel movements is uncertain whether he had a bowel 

movement today or not.  Patient reports having something happened to her room 

in the past and was secondary to prostatitis.  Patient reports history of a 

TURP secondary to this.  Severity mild.





PFSH


Past Medical History


Hx Anticoagulant Therapy:  Yes


Arthritis:  Yes


Asthma:  No


Blood Disorders:  No


Anxiety:  No


Depression:  No


Heart Rhythm Problems:  Yes


Cancer:  No


Cardiovascular Problems:  Yes


High Cholesterol:  No


Chemotherapy:  No


Chest Pain:  No


Congestive Heart Failure:  No


COPD:  No


Cerebrovascular Accident:  No


Diabetes:  No


Diminished Hearing:  No


Endocrine:  No


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 

eosophagus occurs)


GERD:  Yes


Genitourinary:  No


Hepatitis:  No


Hiatal Hernia:  No


Hypertension:  Yes


Immune Disorder:  No


Implanted Vascular Access Dvce:  Yes


Medical other:  No


Musculoskeletal:  Yes (weakness while walking)


Neurologic:  Yes (choking and coughing and aspiration hx)


Psychiatric:  No


Reproductive:  No


Respiratory:  Yes (hx of aspiration and eosophageal dialation procedure)


Radiation Therapy:  No


Sleep Apnea:  No


Thyroid Disease:  No





Past Surgical History


Abdominal Surgery:  No


AICD:  No


Appendectomy:  Yes


Body Medical Devices:  Right knee


Cardiac Surgery:  No


Ear Surgery:  No


Eye Surgery:  Yes (DETACHED RETINA REPAIR)


Genitourinary Surgery:  Yes


Gynecologic Surgery:  No


Joint Replacement:  Yes (R TKA)


Neurologic Surgery:  No


Oral Surgery:  No


Pacemaker:  No


Prostatectomy:  Yes (Partial)


Thoracic Surgery:  No


Other Surgery:  Yes (right knee replacement hx, dialation of eosophagus)





Social History


Alcohol Use:  Yes (2-3 times weekly)


Tobacco Use:  No (QUIT 1965)


Substance Use:  No





Allergies-Medications


(Allergen,Severity, Reaction):  


Coded Allergies:  


     Sulfa (Sulfonamide Antibiotics) (Verified  Allergy, Severe, MIGRAINES, RED 

EYES, 4/30/18)


Reported Meds & Prescriptions





Reported Meds & Active Scripts


Active


Commode 3-in-1 (Device) 1 Mis Mis Ea .XX AS DIRECTED


Hyosyne Liq Drops (Hyoscyamine Sulfate) 0.125 Mg/Ml Soln 0.125 Mg PO Q4HR PRN 

30 Days


Pradaxa (Dabigatran) 150 Mg Cap 150 Mg PO BID


     resume on 11/4


Reported


Aricept (Donepezil) 23 Mg Tab 5 Mg PO HS


     Do not split, crushed or chewed.


Carbidopa-Levodopa  Mg Tab 0.5 Tab PO TID


     taken at 0800, 1300, 1800


Vitamin D3 (Cholecalciferol) 1,000 Unit Tab 1,000 Units PO DAILY


Pantoprazole (Pantoprazole Sodium) 40 Mg Tab 40 Mg PO BID


Memantine 10 Mg Tab 10 Mg PO BID


Norvasc (Amlodipine Besylate) 5 Mg Tab 5 Mg PO BID


Lipitor (Atorvastatin Calcium) 40 Mg Tab 40 Mg PO HS


Atenolol 25 Mg Tab 25 Mg PO DAILY








Review of Systems


Except as stated in HPI:  all other systems reviewed are Neg





Physical Exam


Narrative


GENERAL: Well-developed, overly nourished, in no acute distress, and non-ill 

appearing.


SKIN: Focused skin assessment warm and dry.


HEAD: Atraumatic. Normocephalic. 


EYES: Pupils equal and round. EOMI. No scleral icterus. No injection or 

drainage. 


ENT: No nasal bleeding or discharge.  Mucous membranes pink and moist.


NECK: Trachea midline. Supple.  No nuclear rigidity.


RESPIRATORY: No accessory muscle use.  No respiratory distress. 


GASTROINTESTINAL: Abdomen soft, non-tender, nondistended, and no guarding. 

Hepatic and splenic margins not palpable.  No pulsatile mass.


GENITOURINARY:  Circumcised. Testes descended bilaterally without evidence of 

rotation.  No lesions or erythema. No urethral discharge, but scant amount of 

blood noted around the glans penis.


MUSCULOSKELETAL: No obvious deformities. No clubbing.  No cyanosis.  No edema.  

Full range of motion.


NEUROLOGICAL: Awake and alert. No obvious cranial nerve deficits.  Motor 

grossly within normal limits. Normal speech.


PSYCHIATRIC: Appropriate mood and affect; insight and judgment normal.





Data


Data


Last Documented VS





Vital Signs








  Date Time  Temp Pulse Resp B/P (MAP) Pulse Ox O2 Delivery O2 Flow Rate FiO2


 


4/30/18 19:51 97.7 64 20 153/78 (103) 94   








Orders





 Orders


Complete Blood Count With Diff (4/30/18 20:06)


Comprehensive Metabolic Panel (4/30/18 20:06)


Prothrombin Time / Inr (Pt) (4/30/18 20:06)


Act Partial Throm Time (Ptt) (4/30/18 20:06)


Urinalysis - C+S If Indicated (4/30/18 20:06)


Ct Abd/Pel W/O Iv Contrast (4/30/18 20:06)


Iv Access Insert/Monitor (4/30/18 20:06)


Ecg Monitoring (4/30/18 20:06)


Oximetry (4/30/18 20:06)


Sodium Chloride 0.9% Flush (Ns Flush) (4/30/18 20:15)


Urine Culture (4/30/18 20:30)


Continue Cobb/Suprapubic Cath (4/30/18 21:47)


Admit Order (Ed Use Only) (4/30/18 21:56)


Ciprofloxacin 400 Mg Premix (Cipro 400 M (4/30/18 22:00)





Labs





Laboratory Tests








Test


  4/30/18


20:30


 


White Blood Count 7.4 TH/MM3 


 


Red Blood Count 3.92 MIL/MM3 


 


Hemoglobin 12.8 GM/DL 


 


Hematocrit 37.1 % 


 


Mean Corpuscular Volume 94.5 FL 


 


Mean Corpuscular Hemoglobin 32.6 PG 


 


Mean Corpuscular Hemoglobin


Concent 34.4 % 


 


 


Red Cell Distribution Width 14.3 % 


 


Platelet Count 185 TH/MM3 


 


Mean Platelet Volume 9.8 FL 


 


Neutrophils (%) (Auto) 61.0 % 


 


Lymphocytes (%) (Auto) 24.1 % 


 


Monocytes (%) (Auto) 12.5 % 


 


Eosinophils (%) (Auto) 1.8 % 


 


Basophils (%) (Auto) 0.6 % 


 


Neutrophils # (Auto) 4.5 TH/MM3 


 


Lymphocytes # (Auto) 1.8 TH/MM3 


 


Monocytes # (Auto) 0.9 TH/MM3 


 


Eosinophils # (Auto) 0.1 TH/MM3 


 


Basophils # (Auto) 0.0 TH/MM3 


 


CBC Comment DIFF FINAL 


 


Differential Comment  


 


Prothrombin Time 12.0 SEC 


 


Prothromb Time International


Ratio 1.2 RATIO 


 


 


Activated Partial


Thromboplast Time 33.8 SEC 


 


 


Urine Color RED 


 


Urine Turbidity CLOUDY 


 


Urine pH 6.5 


 


Urine Specific Gravity 1.031 


 


Urine Protein


  300 OR GREATER


mg/dL


 


Urine Glucose (UA) NEG mg/dL 


 


Urine Ketones TRACE mg/dL 


 


Urine Occult Blood LARGE 


 


Urine Nitrite POS 


 


Urine Bilirubin NEG 


 


Urine Leukocyte Esterase NEGATIVE 


 


Urine RBC  /hpf 


 


Microscopic Urinalysis Comment


  CULTURE


INDICATED


 


Blood Urea Nitrogen 17 MG/DL 


 


Creatinine 1.09 MG/DL 


 


Random Glucose 114 MG/DL 


 


Total Protein 6.4 GM/DL 


 


Albumin 3.3 GM/DL 


 


Calcium Level 8.4 MG/DL 


 


Alkaline Phosphatase 77 U/L 


 


Aspartate Amino Transf


(AST/SGOT) 21 U/L 


 


 


Alanine Aminotransferase


(ALT/SGPT) 14 U/L 


 


 


Total Bilirubin 0.7 MG/DL 


 


Sodium Level 142 MEQ/L 


 


Potassium Level 4.3 MEQ/L 


 


Chloride Level 111 MEQ/L 


 


Carbon Dioxide Level 23.5 MEQ/L 


 


Anion Gap 8 MEQ/L 


 


Estimat Glomerular Filtration


Rate 64 ML/MIN 


 











MDM


Medical Decision Making


Medical Screen Exam Complete:  Yes


Emergency Medical Condition:  Yes


Interpretation(s)





Last Impressions








Abdomen/Pelvis CT 4/30/18 2006 Signed





Impressions: 





 Service Date/Time:  Monday, April 30, 2018 21:00 - CONCLUSION:  1. 





 Nonobstructing 6 mm calculus left kidney. Bilateral renal cysts mostly in the 





 parapelvic region. 2. Enlarged prostate. Mild dependent density in the bladder 





 could represent some bladder hemorrhage given history of hematuria.     Jack Borges MD 








Differential Diagnosis


Renal calculi, UTI, bladder hemorrhage, metabolic disturbance, anemia, hematuria


Narrative Course


Patient was seen and examined.  Initial laboratory and radiological studies 

were ordered.  IV was established patient was placed on cardiac monitoring.  

Discussed patient with urologist recommends having 18 French catheter placed 

for irrigated as needed.  IV antibiotics Cipro was ordered. Discussed patient 

with Dr. Fritz, who saw and evaluated the patient is in agreement plan 

of care disposition.  Discussed patient with hospitalist is agreeable to admit 

the patient.  Discussed all findings plan of care with patient and family.  

Patient is agreeable for admission.  All questions were answered.  Patient 

remained stable throughout ED course.





Physician Communication


Physician Communication


2145 discussed patient with Dr. Thornton urologist on-call recommends placing 18 

French with irrigation as needed.  Admit to hospitalist.





2156 discussed patient with Dr. An, who is agreeable to admit the 

patient.





Diagnosis





 Primary Impression:  


 Bladder hemorrhage


 Additional Impression:  


 UTI (urinary tract infection)


 Qualified Codes:  N39.0 - Urinary tract infection, site not specified; R31.9 - 

Hematuria, unspecified





Admitting Information


Admitting Physician Requests:  Admit


Condition:  Stable











Shawn Small Apr 30, 2018 20:14

## 2018-04-30 NOTE — PD
Physical Exam


Date Seen by Provider:  Apr 30, 2018


Time Seen by Provider:  21:00


Narrative


I, Dr. Fritz, have reviewed the advance practice practitioner's 

documentation and am in agreement, met with the patient face to face, made the 

diagnosis, and the medical decision making was done by me.  





*My assessment and Findings: Patient seen and evaluated with PA, please see PA 

note for further details.  Patient is on Pradaxa, here with hematuria.  H&H is 

stable.  CAT scan is showing possible clots within the bladder, and a UTI, and 

case was discussed with urology on-call who would like us to put in a three-way 

catheter.  Planning to treat with antibiotics.  Abdomen was fairly benign on 

evaluation, he is no longer bleeding here in the ER.  Case was then discussed 

with hospitalist for admission.








Laboratory Tests








Test


  4/30/18


20:30


 


Red Blood Count


  3.92 MIL/MM3


(4.50-5.90)


 


Hemoglobin


  12.8 GM/DL


(13.0-17.0)


 


Hematocrit


  37.1 %


(39.0-51.0)


 


Monocytes (%) (Auto)


  12.5 %


(0.0-8.0)


 


Prothrombin Time


  12.0 SEC


(9.8-11.6)


 


Activated Partial


Thromboplast Time 33.8 SEC


(24.3-30.1)


 


Urine Color


  RED


(YELLW/STRAW)


 


Urine Turbidity CLOUDY (CLEAR) 


 


Urine Protein


  300 OR GREATER


mg/dL


 


Urine Ketones


  TRACE mg/dL


(NEG)


 


Urine Occult Blood LARGE (NEG) 


 


Urine Nitrite POS (NEG) 


 


Random Glucose


  114 MG/DL


()


 


Albumin


  3.3 GM/DL


(3.4-5.0)


 


Calcium Level


  8.4 MG/DL


(8.5-10.1)


 


Chloride Level


  111 MEQ/L


()


 


Estimat Glomerular Filtration


Rate 64 ML/MIN


(>89)











Last 24 hours Impressions








Abdomen/Pelvis CT 4/30/18 2006 Signed





Impressions: 





 Service Date/Time:  Monday, April 30, 2018 21:00 - CONCLUSION:  1. 





 Nonobstructing 6 mm calculus left kidney. Bilateral renal cysts mostly in the 





 parapelvic region. 2. Enlarged prostate. Mild dependent density in the bladder 





 could represent some bladder hemorrhage given history of hematuria.     aJck Borges MD 











Data


Data


Last Documented VS





Vital Signs








  Date Time  Temp Pulse Resp B/P (MAP) Pulse Ox O2 Delivery O2 Flow Rate FiO2


 


4/30/18 19:51 97.7 64 20 153/78 (103) 94   








Orders





 Orders


Complete Blood Count With Diff (4/30/18 20:06)


Comprehensive Metabolic Panel (4/30/18 20:06)


Prothrombin Time / Inr (Pt) (4/30/18 20:06)


Act Partial Throm Time (Ptt) (4/30/18 20:06)


Urinalysis - C+S If Indicated (4/30/18 20:06)


Ct Abd/Pel W/O Iv Contrast (4/30/18 20:06)


Iv Access Insert/Monitor (4/30/18 20:06)


Ecg Monitoring (4/30/18 20:06)


Oximetry (4/30/18 20:06)


Sodium Chloride 0.9% Flush (Ns Flush) (4/30/18 20:15)


Urine Culture (4/30/18 20:30)


Continue Cobb/Suprapubic Cath (4/30/18 21:47)


Admit Order (Ed Use Only) (4/30/18 21:56)


Ciprofloxacin 400 Mg Premix (Cipro 400 M (4/30/18 22:00)





Labs





Laboratory Tests








Test


  4/30/18


20:30


 


White Blood Count 7.4 TH/MM3 


 


Red Blood Count 3.92 MIL/MM3 


 


Hemoglobin 12.8 GM/DL 


 


Hematocrit 37.1 % 


 


Mean Corpuscular Volume 94.5 FL 


 


Mean Corpuscular Hemoglobin 32.6 PG 


 


Mean Corpuscular Hemoglobin


Concent 34.4 % 


 


 


Red Cell Distribution Width 14.3 % 


 


Platelet Count 185 TH/MM3 


 


Mean Platelet Volume 9.8 FL 


 


Neutrophils (%) (Auto) 61.0 % 


 


Lymphocytes (%) (Auto) 24.1 % 


 


Monocytes (%) (Auto) 12.5 % 


 


Eosinophils (%) (Auto) 1.8 % 


 


Basophils (%) (Auto) 0.6 % 


 


Neutrophils # (Auto) 4.5 TH/MM3 


 


Lymphocytes # (Auto) 1.8 TH/MM3 


 


Monocytes # (Auto) 0.9 TH/MM3 


 


Eosinophils # (Auto) 0.1 TH/MM3 


 


Basophils # (Auto) 0.0 TH/MM3 


 


CBC Comment DIFF FINAL 


 


Differential Comment  


 


Prothrombin Time 12.0 SEC 


 


Prothromb Time International


Ratio 1.2 RATIO 


 


 


Activated Partial


Thromboplast Time 33.8 SEC 


 


 


Urine Color RED 


 


Urine Turbidity CLOUDY 


 


Urine pH 6.5 


 


Urine Specific Gravity 1.031 


 


Urine Protein


  300 OR GREATER


mg/dL


 


Urine Glucose (UA) NEG mg/dL 


 


Urine Ketones TRACE mg/dL 


 


Urine Occult Blood LARGE 


 


Urine Nitrite POS 


 


Urine Bilirubin NEG 


 


Urine Leukocyte Esterase NEGATIVE 


 


Urine RBC  /hpf 


 


Microscopic Urinalysis Comment


  CULTURE


INDICATED


 


Blood Urea Nitrogen 17 MG/DL 


 


Creatinine 1.09 MG/DL 


 


Random Glucose 114 MG/DL 


 


Total Protein 6.4 GM/DL 


 


Albumin 3.3 GM/DL 


 


Calcium Level 8.4 MG/DL 


 


Alkaline Phosphatase 77 U/L 


 


Aspartate Amino Transf


(AST/SGOT) 21 U/L 


 


 


Alanine Aminotransferase


(ALT/SGPT) 14 U/L 


 


 


Total Bilirubin 0.7 MG/DL 


 


Sodium Level 142 MEQ/L 


 


Potassium Level 4.3 MEQ/L 


 


Chloride Level 111 MEQ/L 


 


Carbon Dioxide Level 23.5 MEQ/L 


 


Anion Gap 8 MEQ/L 


 


Estimat Glomerular Filtration


Rate 64 ML/MIN 


 











[facility]


Medical Record Reviewed:  Yes


Supervised Visit with ALONDRA:  Yes


Diagnosis





 Primary Impression:  


 Bladder hemorrhage


 Additional Impression:  


 UTI (urinary tract infection)


 Qualified Codes:  N39.0 - Urinary tract infection, site not specified; R31.9 - 

Hematuria, unspecified





Admitting Information


Admitting Physician Requests:  it











Leta Fritz MD Apr 30, 2018 22:09

## 2018-05-01 VITALS
SYSTOLIC BLOOD PRESSURE: 135 MMHG | OXYGEN SATURATION: 97 % | RESPIRATION RATE: 17 BRPM | DIASTOLIC BLOOD PRESSURE: 76 MMHG | HEART RATE: 70 BPM | TEMPERATURE: 97.4 F

## 2018-05-01 VITALS
TEMPERATURE: 97.8 F | HEART RATE: 77 BPM | RESPIRATION RATE: 16 BRPM | DIASTOLIC BLOOD PRESSURE: 87 MMHG | SYSTOLIC BLOOD PRESSURE: 134 MMHG | OXYGEN SATURATION: 94 %

## 2018-05-01 VITALS
OXYGEN SATURATION: 92 % | RESPIRATION RATE: 16 BRPM | HEART RATE: 96 BPM | TEMPERATURE: 97.9 F | SYSTOLIC BLOOD PRESSURE: 146 MMHG | DIASTOLIC BLOOD PRESSURE: 84 MMHG

## 2018-05-01 VITALS
OXYGEN SATURATION: 95 % | HEART RATE: 63 BPM | TEMPERATURE: 98 F | RESPIRATION RATE: 16 BRPM | SYSTOLIC BLOOD PRESSURE: 146 MMHG | DIASTOLIC BLOOD PRESSURE: 70 MMHG

## 2018-05-01 VITALS
TEMPERATURE: 97.6 F | HEART RATE: 93 BPM | RESPIRATION RATE: 18 BRPM | OXYGEN SATURATION: 96 % | DIASTOLIC BLOOD PRESSURE: 58 MMHG | SYSTOLIC BLOOD PRESSURE: 114 MMHG

## 2018-05-01 VITALS
HEART RATE: 60 BPM | OXYGEN SATURATION: 94 % | RESPIRATION RATE: 18 BRPM | SYSTOLIC BLOOD PRESSURE: 110 MMHG | DIASTOLIC BLOOD PRESSURE: 63 MMHG | TEMPERATURE: 98.3 F

## 2018-05-01 LAB
BASOPHILS # BLD AUTO: 0 TH/MM3 (ref 0–0.2)
BASOPHILS NFR BLD: 0.5 % (ref 0–2)
BUN SERPL-MCNC: 15 MG/DL (ref 7–18)
CALCIUM SERPL-MCNC: 8.2 MG/DL (ref 8.5–10.1)
CHLORIDE SERPL-SCNC: 110 MEQ/L (ref 98–107)
CREAT SERPL-MCNC: 0.86 MG/DL (ref 0.6–1.3)
EOSINOPHIL # BLD: 0.1 TH/MM3 (ref 0–0.4)
EOSINOPHIL NFR BLD: 1.9 % (ref 0–4)
ERYTHROCYTE [DISTWIDTH] IN BLOOD BY AUTOMATED COUNT: 14.4 % (ref 11.6–17.2)
GFR SERPLBLD BASED ON 1.73 SQ M-ARVRAT: 84 ML/MIN (ref 89–?)
GLUCOSE SERPL-MCNC: 100 MG/DL (ref 74–106)
HCO3 BLD-SCNC: 26.5 MEQ/L (ref 21–32)
HCT VFR BLD CALC: 36.1 % (ref 39–51)
HGB BLD-MCNC: 12.4 GM/DL (ref 13–17)
LYMPHOCYTES # BLD AUTO: 1.8 TH/MM3 (ref 1–4.8)
LYMPHOCYTES NFR BLD AUTO: 32 % (ref 9–44)
MCH RBC QN AUTO: 32.6 PG (ref 27–34)
MCHC RBC AUTO-ENTMCNC: 34.4 % (ref 32–36)
MCV RBC AUTO: 94.9 FL (ref 80–100)
MONOCYTE #: 0.6 TH/MM3 (ref 0–0.9)
MONOCYTES NFR BLD: 11.5 % (ref 0–8)
NEUTROPHILS # BLD AUTO: 3.1 TH/MM3 (ref 1.8–7.7)
NEUTROPHILS NFR BLD AUTO: 54.1 % (ref 16–70)
PLATELET # BLD: 163 TH/MM3 (ref 150–450)
PMV BLD AUTO: 10 FL (ref 7–11)
RBC # BLD AUTO: 3.8 MIL/MM3 (ref 4.5–5.9)
SODIUM SERPL-SCNC: 144 MEQ/L (ref 136–145)
WBC # BLD AUTO: 5.6 TH/MM3 (ref 4–11)

## 2018-05-01 RX ADMIN — MEMANTINE HYDROCHLORIDE SCH MG: 10 TABLET ORAL at 21:49

## 2018-05-01 RX ADMIN — CARBIDOPA AND LEVODOPA SCH TAB: 25; 250 TABLET ORAL at 14:32

## 2018-05-01 RX ADMIN — CARBIDOPA AND LEVODOPA SCH TAB: 25; 250 TABLET ORAL at 18:30

## 2018-05-01 RX ADMIN — ATENOLOL SCH MG: 25 TABLET ORAL at 10:00

## 2018-05-01 RX ADMIN — PHENYTOIN SODIUM SCH MLS/HR: 50 INJECTION INTRAMUSCULAR; INTRAVENOUS at 18:30

## 2018-05-01 RX ADMIN — VITAMIN D, TAB 1000IU (100/BT) SCH UNITS: 25 TAB at 10:00

## 2018-05-01 RX ADMIN — PHENYTOIN SODIUM SCH MLS/HR: 50 INJECTION INTRAMUSCULAR; INTRAVENOUS at 21:58

## 2018-05-01 RX ADMIN — PANTOPRAZOLE SCH MG: 40 TABLET, DELAYED RELEASE ORAL at 10:01

## 2018-05-01 RX ADMIN — ATORVASTATIN CALCIUM SCH MG: 40 TABLET, FILM COATED ORAL at 21:49

## 2018-05-01 RX ADMIN — DONEPEZIL HYDROCHLORIDE SCH MG: 5 TABLET, FILM COATED ORAL at 21:49

## 2018-05-01 RX ADMIN — MEMANTINE HYDROCHLORIDE SCH MG: 10 TABLET ORAL at 10:01

## 2018-05-01 RX ADMIN — CARBIDOPA AND LEVODOPA SCH TAB: 25; 250 TABLET ORAL at 10:01

## 2018-05-01 RX ADMIN — PANTOPRAZOLE SCH MG: 40 TABLET, DELAYED RELEASE ORAL at 21:49

## 2018-05-01 NOTE — HHI.HP
HPI


Service


[facility]


Primary Care Physician


Farhat Degroot MD


Admission Diagnosis





Bladder hemorrhage, UTI


Chief Complaint:  


"peeing blood"


Travel History


International Travel<30 Days:  No


Contact w/Intl Traveler <30 Da:  No


Traveled to Known Affected Are:  No


History of Present Illness


Patient is a pleasant 86-year-old male with multiple medical problems including 

hypertension, atrial fibrillation on Pradaxa, hypertension, hyperlipidemia, GERD

, BPH status post TURP , and Parkinson's disease with mild dementia.


Most recently patient was hospitalized at [facility] due to dysphagia/esophageal 

stricture 3/7/18 to 3/10/18.


Patient presented tot he ER last night due to hematuria.  Patient has wife 

report patient awoke approximately 10:00 yesterday morning noticed red blood/

urine in brief.  Then again a few hours later patient was sitting on toilet 

passing eli red blood.  Patient presented and wife proceeded to the emergency 

department for further evaluation and treatment.  Patient denies pain or 

burning.  Patient is on Pradaxa but denies any recent changes in medications.  

Patient did have TURP in  with Dr. Greco has not had any recent bladder 

interventions catheterizations or trauma.


Patient denies feeling lightheaded, dizzy, nausea, vomiting, diarrhea, 

constipation, fevers, chills, shortness of breath or chest pain.





Past Family Social History


Past Medical History


1) hypertension


2) atrial fibrillation


   - on pradaxa


   - pt follows with Dr. Pang


3) hyperlipidemia


4) dementia


5) esophageal stricture, status post dilation


6) pulmonary hypertension, mild


7) GERD


8) BPH


   - TURP 13   


   - TURP done by Dr. Greco


9) diverticulosis


10) Peyronie's disease/erectile dysfunction, patient follows with urology


11) small right inguinal hernia


   - Evaluated by surgery in the past but surgical correction not recommended


12) essential tremor


13) carotid disease, mild


14) chronic kidney disease, stage II


15) bilateral gynecomastia


16) atherosclerosis of the aorta


17) Parkinson's disease with mild dementia


Past Surgical History


 1. History of Appendectomy


  193


2. History of Cataract Surgery


  left eye in 


3. History of Complete Colonoscopy


  Colonoscopy on: 6-15-04 (diverticuloisis in  the descending and sigmoid


    colon); 11 (normal colon)


4. History of Diagnostic Esophagogastroduodenoscopy


  2010 by Dr. Nathan revealed gastritis in the antrum, stricture at the GE


    junction dilated to 14 mm and a sessile polyp in the body of the


    stomach. Pathology revealed mild chronic active gastritis and a hyperplastic


    polyp.f/u in six weeks.EGD on: 10-30-16 (food impaction distal esophagus;


    stricture of distal esophagus); 11-3-16 (dilation of an esophageal stricture

,


    gastritis, gastric polyps, hiatal hernia)


5. History of Discission Of Secondary Membranous Cataract By Laser


6. History of Esophageal Dilation


  patient has had multiple dilations in the past. Last two: 05; 12-10-9-09


    (EGD with dilation of stricture at GE junction, gastritis, polyp in stomach)


7. History of Repair Of Retinal Detachment


  left eye 


8. History of Sigmoidoscopy (Fiberoptic)


9. History of Stress Test By Pharmacologic Challenge


  A myocardial perfusion scan on 10-3-16 was negative with an EF of 68%.


10. History of Total Knee Arthroplasty


  right knee 


11. History of Transurethral Resection Of Prostate (TURP)


  TURP on 13


Reported Medications


Hyosyne Liq Drops (Hyoscyamine Sulfate) 0.125 Mg/Ml Soln 0.125 Mg PO Q4HR PRN 

30 Days


Pradaxa (Dabigatran) 150 Mg Cap 150 Mg PO BID


     resume on 


Aricept (Donepezil) 23 Mg Tab 5 Mg PO HS


     Do not split, crushed or chewed.


Carbidopa-Levodopa  Mg Tab 0.5 Tab PO TID


     taken at 0800, 1300, 1800


Vitamin D3 (Cholecalciferol) 1,000 Unit Tab 1,000 Units PO DAILY


Pantoprazole (Pantoprazole Sodium) 40 Mg Tab 40 Mg PO BID


Memantine 10 Mg Tab 10 Mg PO BID


Norvasc (Amlodipine Besylate) 5 Mg Tab 5 Mg PO BID


Lipitor (Atorvastatin Calcium) 40 Mg Tab 40 Mg PO HS


Atenolol 25 Mg Tab 25 Mg PO DAILY


Allergies:  


Coded Allergies:  


     Sulfa (Sulfonamide Antibiotics) (Verified  Allergy, Severe, MIGRAINES, RED 

EYES, 18)


Family History


Noncontributory


Social History


- 


- Retired


- Former smoker


- No alcohol use 


- no illicit street drugs





Physical Exam


Vital Signs





Vital Signs








  Date Time  Temp Pulse Resp B/P (MAP) Pulse Ox O2 Delivery O2 Flow Rate FiO2


 


18 03:43 97.4 70 17 135/76 (95) 97   


 


18 22:50 97.4 72 18 148/86 (106) 94   


 


18 22:25     95 Room Air  


 


18 19:51 97.7 64 20 153/78 (103) 94   








Physical Exam


GENERAL: This is a well-nourished, well-developed patient, in no apparent 

distress.


SKIN: No rashes, ecchymoses or lesions. Cool and dry.


HEAD: Atraumatic. Normocephalic. No temporal or scalp tenderness.


EYES: Extraocular motions intact. No scleral icterus. No injection or drainage. 


CARDIOVASCULAR: Irregularly irregular


RESPIRATORY: Clear to auscultation. Breath sounds equal bilaterally. 


GASTROINTESTINAL: Abdomen soft, non-tender, nondistended. 


GENITOURINARY: Saenz in place draining red urine


MUSCULOSKELETAL: Extremities without clubbing, cyanosis, or edema. No joint 

tenderness, effusion, or edema noted. No calf tenderness. Negative Homans sign 

bilaterally.


NEUROLOGICAL: Awake and alert.  Motor and sensory grossly within normal limits. 

Five out of 5 muscle strength in all muscle groups.  Normal speech.


Laboratory





Laboratory Tests








Test


  18


20:30 18


04:25


 


White Blood Count 7.4  5.6 


 


Red Blood Count 3.92  3.80 


 


Hemoglobin 12.8  12.4 


 


Hematocrit 37.1  36.1 


 


Mean Corpuscular Volume 94.5  94.9 


 


Mean Corpuscular Hemoglobin 32.6  32.6 


 


Mean Corpuscular Hemoglobin


Concent 34.4 


  34.4 


 


 


Red Cell Distribution Width 14.3  14.4 


 


Platelet Count 185  163 


 


Mean Platelet Volume 9.8  10.0 


 


Neutrophils (%) (Auto) 61.0  54.1 


 


Lymphocytes (%) (Auto) 24.1  32.0 


 


Monocytes (%) (Auto) 12.5  11.5 


 


Eosinophils (%) (Auto) 1.8  1.9 


 


Basophils (%) (Auto) 0.6  0.5 


 


Neutrophils # (Auto) 4.5  3.1 


 


Lymphocytes # (Auto) 1.8  1.8 


 


Monocytes # (Auto) 0.9  0.6 


 


Eosinophils # (Auto) 0.1  0.1 


 


Basophils # (Auto) 0.0  0.0 


 


CBC Comment DIFF FINAL  DIFF FINAL 


 


Differential Comment    


 


Prothrombin Time 12.0  


 


Prothromb Time International


Ratio 1.2 


  


 


 


Activated Partial


Thromboplast Time 33.8 


  


 


 


Urine Color RED  


 


Urine Turbidity CLOUDY  


 


Urine pH 6.5  


 


Urine Specific Gravity 1.031  


 


Urine Protein 300 OR GREATER  


 


Urine Glucose (UA) NEG  


 


Urine Ketones TRACE  


 


Urine Occult Blood LARGE  


 


Urine Nitrite POS  


 


Urine Bilirubin NEG  


 


Urine Leukocyte Esterase NEGATIVE  


 


Urine RBC   


 


Microscopic Urinalysis Comment


  CULTURE


INDICATED 


 


 


Blood Urea Nitrogen 17  15 


 


Creatinine 1.09  0.86 


 


Random Glucose 114  100 


 


Total Protein 6.4  


 


Albumin 3.3  


 


Calcium Level 8.4  8.2 


 


Alkaline Phosphatase 77  


 


Aspartate Amino Transf


(AST/SGOT) 21 


  


 


 


Alanine Aminotransferase


(ALT/SGPT) 14 


  


 


 


Total Bilirubin 0.7  


 


Sodium Level 142  144 


 


Potassium Level 4.3  3.7 


 


Chloride Level 111  110 


 


Carbon Dioxide Level 23.5  26.5 


 


Anion Gap 8  8 


 


Estimat Glomerular Filtration


Rate 64 


  84 


 














 Date/Time


Source Procedure


Growth Status


 


 


 18 20:30


Urine Random Urine Urine Culture


Pending Received








Result Diagram:  


185                                                                    

            185





Imaging





Last Impressions








Abdomen/Pelvis CT 18 Signed





Impressions: 





 Service Date/Time:  Monday, 2018 21:00 - CONCLUSION:  1. 





 Nonobstructing 6 mm calculus left kidney. Bilateral renal cysts mostly in the 





 parapelvic region. 2. Enlarged prostate. Mild dependent density in the bladder 





 could represent some bladder hemorrhage given history of hematuria.     Jack Borges MD 











Caprini VTE Risk Assessment


Caprini VTE Risk Assessment:  Mod/High Risk (score >= 2)


Caprini Risk Assessment Model











 Point Value = 1          Point Value = 2  Point Value = 3  Point Value = 5


 


Age 41-60


Minor surgery


BMI > 25 kg/m2


Swollen legs


Varicose veins


Pregnancy or postpartum


History of unexplained or recurrent


   spontaneous 


Oral contraceptives or hormone


   replacement


Sepsis (< 1 month)


Serious lung disease, including


   pneumonia (< 1 month)


Abnormal pulmonary function


Acute myocardial infarction


Congestive heart failure (< 1 month)


History of inflammatory bowel disease


Medical patient at bed rest Age 61-74


Arthroscopic surgery


Major open surgery (> 45 min)


Laparoscopic surgery (> 45 min)


Malignancy


Confined to bed (> 72 hours)


Immobilizing plaster cast


Central venous access Age >= 75


History of VTE


Family history of VTE


Factor V Leiden


Prothrombin 20078H


Lupus anticoagulant


Anticardiolipin antibodies


Elevated serum homocysteine


Heparin-induced thrombocytopenia


Other congenital or acquired


   thrombophilia Stroke (< 1 month)


Elective arthroplasty


Hip, pelvis, or leg fracture


Acute spinal cord injury (< 1 month)








Prophylaxis Regimen











   Total Risk


Factor Score Risk Level Prophylaxis Regimen


 


0-1      Low Early ambulation


 


2 Moderate Order ONE of the following:


*Sequential Compression Device (SCD)


*Heparin 5000 units SQ BID


 


3-4 Higher Order ONE of the following medications:


*Heparin 5000 units SQ TID


*Enoxaparin/Lovenox 40 mg SQ daily (WT < 150 kg, CrCl > 30 mL/min)


*Enoxaparin/Lovenox 30 mg SQ daily (WT < 150 kg, CrCl > 10-29 mL/min)


*Enoxaparin/Lovenox 30 mg SQ BID (WT < 150 kg, CrCl > 30 mL/min)


AND/OR


*Sequential Compression Device (SCD)


 


5 or more Highest Order ONE of the following medications:


*Heparin 5000 units SQ TID (Preferred with Epidurals)


*Enoxaparin/Lovenox 40 mg SQ daily (WT < 150 kg, CrCl > 30 mL/min)


*Enoxaparin/Lovenox 30 mg SQ daily (WT < 150 kg, CrCl > 10-29 mL/min)


*Enoxaparin/Lovenox 30 mg SQ BID (WT < 150 kg, CrCl > 30 mL/min)


AND


*Sequential Compression Device (SCD)











Assessment and Plan


Problem List:  


(1) Hematuria


ICD Codes:  R31.9 - Hematuria, unspecified


Plan:  Hematuria


Saenz in place irrigate as needed


H&H stable


Consultation placed to Dr. Thornton


Await further recommendations per urology





Possible UTI


Urinalysis reviewed and reveals positive nitrates patient given ciprofloxacin 

emergency department


Urine culture reveals: <10,000 gram positive marleen


No further abx





Afib


Rate is currently controlled


Continue patient's home atenolol 25 mg daily


Patient having hematuria will hold Pradaxa





HTN (hypertension)


Continue patient's home atenolol 25 mg daily and amlodipine 5 mg twice daily





Diabetes mellitus type 2, diet-controlled


Diet controlled we will continue diabetic diet





Hyperlipidemia


Continue patient's home atorvastatin 4040 mg p.o. nightly





Dementia


will continue patient's home carbidopa levodopa as well as Memantin





(2) Afib


ICD Codes:  I48.91 - Unspecified atrial fibrillation


(3) HTN (hypertension)


ICD Codes:  I10 - Essential (primary) hypertension


Status:  Chronic


(4) Diabetes mellitus type 2, diet-controlled


ICD Codes:  E11.9 - Type 2 diabetes mellitus without complications


Status:  Chronic


(5) Hyperlipidemia


ICD Codes:  E78.5 - Hyperlipidemia, unspecified


Status:  Chronic


(6) Dementia


ICD Codes:  F03.90 - Unspecified dementia without behavioral disturbance


Status:  Chronic


Assessment and Plan


Patient examined.


Assessment and plan formulated with Carmelina Flowers PA-C.


I agree with the above.


hematuria. saenz. cont ivf. urology following.





Physician Certification


2 Midnight Certification Type:  Admission for Inpatient Services


Order for Inpatient Services


The services are ordered in accordance with Medicare regulations or non-

Medicare payer requirements, as applicable.  In the case of services not 

specified as inpatient-only, they are appropriately provided as inpatient 

services in accordance with the 2-midnight benchmark.


Estimated LOS (days):  3


 days is the estimated time the patient will need to remain in the hospital, 

assuming treatment plan goals are met and no additional complications.


Post-Hospital Plan:  Not yet determined











Carmelina Flowers May 1, 2018 07:50


Tan Gutierrez MD May 1, 2018 18:29

## 2018-05-01 NOTE — PD.CONS
HPI


Service


Urology


Consult Requested By


Dr. An


Reason for Consult


Gross hematuria


Primary Care Physician


Farhat Degroot MD


Diagnosis:  


(1) Hematuria


ICD Code:  R31.9 - Hematuria, unspecified


(2) Afib


ICD Code:  I48.91 - Unspecified atrial fibrillation


(3) HTN (hypertension)


ICD Code:  I10 - Essential (primary) hypertension


(4) Diabetes mellitus type 2, diet-controlled


ICD Code:  E11.9 - Type 2 diabetes mellitus without complications


(5) Hyperlipidemia


ICD Code:  E78.5 - Hyperlipidemia, unspecified


(6) Dementia


ICD Code:  F03.90 - Unspecified dementia without behavioral disturbance


History of Present Illness


87-year-old gentleman with history BPH status post TURP back in 2013 by Dr. Kishan Arnett who presented to the emergency room with new onset gross 

hematuria.  Patient has multiple medical problems including atrial fibrillation 

on Pradaxa.  An indwelling Cobb catheter was placed and a urology consult 

made.  At the time of consultation the patient was noted to have improvement in 

the hematuria.  During present hospitalization a CT scan of the abdomen and 

pelvis was performed that demonstrated a nonobstructing 6 mm left renal 

calculus and a markedly enlarged prostate.





Review of Systems


Constitutional:  DENIES: Fever, Chills


Cardiovascular:  DENIES: Chest pain


Gastrointestinal:  DENIES: Nausea, Vomiting


Genitourinary:  COMPLAINS OF: Hematuria


Except as stated in HPI:  all other systems reviewed are Neg





Past Family Social History


Past Medical History


BPH


GERD


Parkinson's disease


Mild dementia


Atrial fibrillation


Esophageal stricture


Pulmonary hypertension


Diverticulosis


Chronic kidney disease


Past Surgical History


Status post TURP 2013


Status post esophageal dilation


Status post right knee arthroplasty


Status post left cataract surgery


Reported Medications


Refer to EMR


Allergies:  


Coded Allergies:  


     Sulfa (Sulfonamide Antibiotics) (Verified  Allergy, Severe, MIGRAINES, RED 

EYES, 4/30/18)


Active Ordered Medications


Refer to EMR


Family History


Reviewed and noncontributory


Social History


Former smoker


Denies alcohol or intravenous drug abuse history





Physical Exam





Vital Signs








  Date Time  Temp Pulse Resp B/P (MAP) Pulse Ox O2 Delivery O2 Flow Rate FiO2


 


5/1/18 12:00 97.8 77 16 134/87 (103) 94   


 


5/1/18 08:00 98.0 63 16 146/70 (95) 95   


 


5/1/18 03:43 97.4 70 17 135/76 (95) 97   


 


4/30/18 22:50 97.4 72 18 148/86 (106) 94   


 


4/30/18 22:25     95 Room Air  


 


4/30/18 19:51 97.7 64 20 153/78 (103) 94   








Physical Exam


GENERAL: This is a well-nourished, well-developed patient, in no apparent 

distress.


SKIN: No rashes, ecchymoses or lesions. Cool and dry.


HEAD: Atraumatic. Normocephalic. No temporal or scalp tenderness.


EYES: Pupils equal round and reactive. Extraocular motions intact. No scleral 

icterus. No injection or drainage. 


ENT: Nose without bleeding, purulent drainage or septal hematoma. Throat 

without erythema, tonsillar hypertrophy or exudate. Uvula midline. Airway 

patent.


NECK: Trachea midline. No JVD or lymphadenopathy. Supple, nontender, no 

meningeal signs.


GASTROINTESTINAL: Abdomen soft, non-tender, nondistended. No hepato-splenomegaly

, or palpable masses. No guarding.


GENITOURINARY: Bladder not distended, Cobb catheter draining medium red urine 

without clots


MUSCULOSKELETAL: Extremities without clubbing, cyanosis, or edema. No joint 

tenderness, effusion, or edema noted. No calf tenderness. Negative Homans sign 

bilaterally.


NEUROLOGICAL: Awake and alert. Cranial nerves II through XII intact.  Motor and 

sensory grossly within normal limits. Five out of 5 muscle strength in all 

muscle groups.  Normal speech.


Lab results reviewed:  Yes





Laboratory Tests








Test


  4/30/18


20:30 5/1/18


04:25


 


White Blood Count 7.4  5.6 


 


Red Blood Count 3.92  3.80 


 


Hemoglobin 12.8  12.4 


 


Hematocrit 37.1  36.1 


 


Mean Corpuscular Volume 94.5  94.9 


 


Mean Corpuscular Hemoglobin 32.6  32.6 


 


Mean Corpuscular Hemoglobin


Concent 34.4 


  34.4 


 


 


Red Cell Distribution Width 14.3  14.4 


 


Platelet Count 185  163 


 


Mean Platelet Volume 9.8  10.0 


 


Neutrophils (%) (Auto) 61.0  54.1 


 


Lymphocytes (%) (Auto) 24.1  32.0 


 


Monocytes (%) (Auto) 12.5  11.5 


 


Eosinophils (%) (Auto) 1.8  1.9 


 


Basophils (%) (Auto) 0.6  0.5 


 


Neutrophils # (Auto) 4.5  3.1 


 


Lymphocytes # (Auto) 1.8  1.8 


 


Monocytes # (Auto) 0.9  0.6 


 


Eosinophils # (Auto) 0.1  0.1 


 


Basophils # (Auto) 0.0  0.0 


 


CBC Comment DIFF FINAL  DIFF FINAL 


 


Differential Comment    


 


Prothrombin Time 12.0  


 


Prothromb Time International


Ratio 1.2 


  


 


 


Activated Partial


Thromboplast Time 33.8 


  


 


 


Urine Color RED  


 


Urine Turbidity CLOUDY  


 


Urine pH 6.5  


 


Urine Specific Gravity 1.031  


 


Urine Protein 300 OR GREATER  


 


Urine Glucose (UA) NEG  


 


Urine Ketones TRACE  


 


Urine Occult Blood LARGE  


 


Urine Nitrite POS  


 


Urine Bilirubin NEG  


 


Urine Leukocyte Esterase NEGATIVE  


 


Urine RBC   


 


Microscopic Urinalysis Comment


  CULTURE


INDICATED 


 


 


Blood Urea Nitrogen 17  15 


 


Creatinine 1.09  0.86 


 


Random Glucose 114  100 


 


Total Protein 6.4  


 


Albumin 3.3  


 


Calcium Level 8.4  8.2 


 


Alkaline Phosphatase 77  


 


Aspartate Amino Transf


(AST/SGOT) 21 


  


 


 


Alanine Aminotransferase


(ALT/SGPT) 14 


  


 


 


Total Bilirubin 0.7  


 


Sodium Level 142  144 


 


Potassium Level 4.3  3.7 


 


Chloride Level 111  110 


 


Carbon Dioxide Level 23.5  26.5 


 


Anion Gap 8  8 


 


Estimat Glomerular Filtration


Rate 64 


  84 


 














 Date/Time


Source Procedure


Growth Status


 


 


 4/30/18 20:30


Urine Random Urine Urine Culture - Preliminary


<10,000 CFU/ML GRAM POSITIVE JANETH Resulted








Result Diagram:  


5/1/18 0425                                                                    

            5/1/18 0425





Personally reviewed images:  Yes


Imaging





Last Impressions








Abdomen/Pelvis CT 4/30/18 2006 Signed





Impressions: 





 Service Date/Time:  Monday, April 30, 2018 21:00 - CONCLUSION:  1. 





 Nonobstructing 6 mm calculus left kidney. Bilateral renal cysts mostly in the 





 parapelvic region. 2. Enlarged prostate. Mild dependent density in the bladder 





 could represent some bladder hemorrhage given history of hematuria.     Jack Borges MD 











Assessment and Plan


Assessment and Plan


Urologic impression:


1.  History BPH status post TURP


2.  New onset gross hematuria likely related to BPH history with use of Pradaxa


3.  6 mm nonobstructing left renal calculus





Recommendations:


1.  Maintain Cobb catheter to gravity drainage until urine remains clear 

yellow in appearance for at least 48 hours


2.  Office follow-up visit 3-4 weeks after hospital discharge for reevaluation


3.  Conservative management of the nonobstructing left renal calculus.











Jeff Thornton MD May 1, 2018 12:58

## 2018-05-02 VITALS
OXYGEN SATURATION: 93 % | TEMPERATURE: 97.7 F | DIASTOLIC BLOOD PRESSURE: 66 MMHG | SYSTOLIC BLOOD PRESSURE: 117 MMHG | HEART RATE: 70 BPM | RESPIRATION RATE: 18 BRPM

## 2018-05-02 VITALS
RESPIRATION RATE: 18 BRPM | OXYGEN SATURATION: 93 % | HEART RATE: 69 BPM | TEMPERATURE: 97.5 F | SYSTOLIC BLOOD PRESSURE: 136 MMHG | DIASTOLIC BLOOD PRESSURE: 96 MMHG

## 2018-05-02 VITALS
SYSTOLIC BLOOD PRESSURE: 128 MMHG | DIASTOLIC BLOOD PRESSURE: 75 MMHG | OXYGEN SATURATION: 94 % | TEMPERATURE: 98.3 F | HEART RATE: 71 BPM | RESPIRATION RATE: 18 BRPM

## 2018-05-02 VITALS
DIASTOLIC BLOOD PRESSURE: 59 MMHG | SYSTOLIC BLOOD PRESSURE: 114 MMHG | OXYGEN SATURATION: 95 % | RESPIRATION RATE: 18 BRPM | TEMPERATURE: 98.6 F | HEART RATE: 55 BPM

## 2018-05-02 LAB
BASOPHILS # BLD AUTO: 0 TH/MM3 (ref 0–0.2)
BASOPHILS NFR BLD: 0.6 % (ref 0–2)
EOSINOPHIL # BLD: 0.2 TH/MM3 (ref 0–0.4)
EOSINOPHIL NFR BLD: 2.5 % (ref 0–4)
ERYTHROCYTE [DISTWIDTH] IN BLOOD BY AUTOMATED COUNT: 14.4 % (ref 11.6–17.2)
HCT VFR BLD CALC: 36.6 % (ref 39–51)
HGB BLD-MCNC: 12.6 GM/DL (ref 13–17)
LYMPHOCYTES # BLD AUTO: 1.7 TH/MM3 (ref 1–4.8)
LYMPHOCYTES NFR BLD AUTO: 27.2 % (ref 9–44)
MCH RBC QN AUTO: 32.6 PG (ref 27–34)
MCHC RBC AUTO-ENTMCNC: 34.5 % (ref 32–36)
MCV RBC AUTO: 94.3 FL (ref 80–100)
MONOCYTE #: 0.7 TH/MM3 (ref 0–0.9)
MONOCYTES NFR BLD: 12.2 % (ref 0–8)
NEUTROPHILS # BLD AUTO: 3.5 TH/MM3 (ref 1.8–7.7)
NEUTROPHILS NFR BLD AUTO: 57.5 % (ref 16–70)
PLATELET # BLD: 159 TH/MM3 (ref 150–450)
PMV BLD AUTO: 10.2 FL (ref 7–11)
RBC # BLD AUTO: 3.88 MIL/MM3 (ref 4.5–5.9)
WBC # BLD AUTO: 6.2 TH/MM3 (ref 4–11)

## 2018-05-02 RX ADMIN — PANTOPRAZOLE SCH MG: 40 TABLET, DELAYED RELEASE ORAL at 20:28

## 2018-05-02 RX ADMIN — PANTOPRAZOLE SCH MG: 40 TABLET, DELAYED RELEASE ORAL at 08:27

## 2018-05-02 RX ADMIN — MEMANTINE HYDROCHLORIDE SCH MG: 10 TABLET ORAL at 20:28

## 2018-05-02 RX ADMIN — ATENOLOL SCH MG: 25 TABLET ORAL at 08:26

## 2018-05-02 RX ADMIN — CARBIDOPA AND LEVODOPA SCH TAB: 25; 250 TABLET ORAL at 12:23

## 2018-05-02 RX ADMIN — MEMANTINE HYDROCHLORIDE SCH MG: 10 TABLET ORAL at 08:27

## 2018-05-02 RX ADMIN — CARBIDOPA AND LEVODOPA SCH TAB: 25; 250 TABLET ORAL at 17:40

## 2018-05-02 RX ADMIN — PHENYTOIN SODIUM SCH MLS/HR: 50 INJECTION INTRAMUSCULAR; INTRAVENOUS at 08:30

## 2018-05-02 RX ADMIN — VITAMIN D, TAB 1000IU (100/BT) SCH UNITS: 25 TAB at 08:27

## 2018-05-02 RX ADMIN — CARBIDOPA AND LEVODOPA SCH TAB: 25; 250 TABLET ORAL at 08:27

## 2018-05-02 RX ADMIN — DONEPEZIL HYDROCHLORIDE SCH MG: 5 TABLET, FILM COATED ORAL at 20:29

## 2018-05-02 RX ADMIN — ATORVASTATIN CALCIUM SCH MG: 40 TABLET, FILM COATED ORAL at 20:28

## 2018-05-02 RX ADMIN — PHENYTOIN SODIUM SCH MLS/HR: 50 INJECTION INTRAMUSCULAR; INTRAVENOUS at 20:28

## 2018-05-02 NOTE — HHI.PR
Subjective


Remarks


Pts urine is much lighter, some bloody sediment in the Cobb but overall 

improving


No complaints of abd pain


Complains of some burning pain at the Cobb insertion site





Objective


Vitals





Vital Signs








  Date Time  Temp Pulse Resp B/P (MAP) Pulse Ox O2 Delivery O2 Flow Rate FiO2


 


5/2/18 08:00 97.5 69 18 136/96 (109) 93   


 


5/1/18 23:48      Room Air  


 


5/1/18 22:48 97.6 93 18 114/58 (76) 96   


 


5/1/18 20:52 98.3 60 18 110/63 (79) 94   


 


5/1/18 16:00 97.9 96 16 146/84 (104) 92   


 


5/1/18 12:00 97.8 77 16 134/87 (103) 94   








Result Diagram:  


5/2/18 0404                                                                    

            5/1/18 0425





Other Results





 Laboratory Tests








Test


  4/30/18


20:30 5/1/18


04:25 5/2/18


04:04


 


White Blood Count 7.4 TH/MM3  5.6 TH/MM3  6.2 TH/MM3 


 


Red Blood Count 3.92 MIL/MM3  3.80 MIL/MM3  3.88 MIL/MM3 


 


Hemoglobin 12.8 GM/DL  12.4 GM/DL  12.6 GM/DL 


 


Hematocrit 37.1 %  36.1 %  36.6 % 


 


Mean Corpuscular Volume 94.5 FL  94.9 FL  94.3 FL 


 


Mean Corpuscular Hemoglobin 32.6 PG  32.6 PG  32.6 PG 


 


Mean Corpuscular Hemoglobin


Concent 34.4 % 


  34.4 % 


  34.5 % 


 


 


Red Cell Distribution Width 14.3 %  14.4 %  14.4 % 


 


Platelet Count 185 TH/MM3  163 TH/MM3  159 TH/MM3 


 


Mean Platelet Volume 9.8 FL  10.0 FL  10.2 FL 


 


Neutrophils (%) (Auto) 61.0 %  54.1 %  57.5 % 


 


Lymphocytes (%) (Auto) 24.1 %  32.0 %  27.2 % 


 


Monocytes (%) (Auto) 12.5 %  11.5 %  12.2 % 


 


Eosinophils (%) (Auto) 1.8 %  1.9 %  2.5 % 


 


Basophils (%) (Auto) 0.6 %  0.5 %  0.6 % 


 


Neutrophils # (Auto) 4.5 TH/MM3  3.1 TH/MM3  3.5 TH/MM3 


 


Lymphocytes # (Auto) 1.8 TH/MM3  1.8 TH/MM3  1.7 TH/MM3 


 


Monocytes # (Auto) 0.9 TH/MM3  0.6 TH/MM3  0.7 TH/MM3 


 


Eosinophils # (Auto) 0.1 TH/MM3  0.1 TH/MM3  0.2 TH/MM3 


 


Basophils # (Auto) 0.0 TH/MM3  0.0 TH/MM3  0.0 TH/MM3 


 


CBC Comment DIFF FINAL  DIFF FINAL  DIFF FINAL 


 


Differential Comment      


 


Prothrombin Time 12.0 SEC   


 


Prothromb Time International


Ratio 1.2 RATIO 


  


  


 


 


Activated Partial


Thromboplast Time 33.8 SEC 


  


  


 


 


Urine Color RED   


 


Urine Turbidity CLOUDY   


 


Urine pH 6.5   


 


Urine Specific Gravity 1.031   


 


Urine Protein


  300 OR GREATER


mg/dL 


  


 


 


Urine Glucose (UA) NEG mg/dL   


 


Urine Ketones TRACE mg/dL   


 


Urine Occult Blood LARGE   


 


Urine Nitrite POS   


 


Urine Bilirubin NEG   


 


Urine Leukocyte Esterase NEGATIVE   


 


Urine RBC  /hpf   


 


Microscopic Urinalysis Comment


  CULTURE


INDICATED 


  


 


 


Blood Urea Nitrogen 17 MG/DL  15 MG/DL  


 


Creatinine 1.09 MG/DL  0.86 MG/DL  


 


Random Glucose 114 MG/DL  100 MG/DL  


 


Total Protein 6.4 GM/DL   


 


Albumin 3.3 GM/DL   


 


Calcium Level 8.4 MG/DL  8.2 MG/DL  


 


Alkaline Phosphatase 77 U/L   


 


Aspartate Amino Transf


(AST/SGOT) 21 U/L 


  


  


 


 


Alanine Aminotransferase


(ALT/SGPT) 14 U/L 


  


  


 


 


Total Bilirubin 0.7 MG/DL   


 


Sodium Level 142 MEQ/L  144 MEQ/L  


 


Potassium Level 4.3 MEQ/L  3.7 MEQ/L  


 


Chloride Level 111 MEQ/L  110 MEQ/L  


 


Carbon Dioxide Level 23.5 MEQ/L  26.5 MEQ/L  


 


Anion Gap 8 MEQ/L  8 MEQ/L  


 


Estimat Glomerular Filtration


Rate 64 ML/MIN 


  84 ML/MIN 


  


 








Imaging





Last Impressions








Abdomen/Pelvis CT 4/30/18 2006 Signed





Impressions: 





 Service Date/Time:  Monday, April 30, 2018 21:00 - CONCLUSION:  1. 





 Nonobstructing 6 mm calculus left kidney. Bilateral renal cysts mostly in the 





 parapelvic region. 2. Enlarged prostate. Mild dependent density in the bladder 





 could represent some bladder hemorrhage given history of hematuria.     Jack Borges MD 








Objective Remarks


General: NAD, Awake and alert


Chest: CTA


Cardiac: Irregular


Abd: +BS, soft ND/NT


Ext: No edema





A/P


Problem List:  


(1) Hematuria


ICD Codes:  R31.9 - Hematuria, unspecified


Plan:  


Hematuria


- Pt is an 85 y/o male with hypertension, atrial fibrillation on Pradaxa, 

hypertension, hyperlipidemia, GERD, BPH status post TURP 2013, and Parkinson's 

disease with mild dementia


- He presented to the ED on 4/30/18 with complaints of hematuria


- Cobb in place irrigate as needed


- H&H stable


- Appreciate consultation from Dr. Hillary schmitz he is recommending to maintain 

Cobb catheter to gravity drainage until urine remains clear yellow in 

appearance for at least 48 hours





Possible UTI


- Urinalysis reviewed and reveals positive nitrates patient given ciprofloxacin 

emergency department


- Urine culture reveals: <10,000 gram positive marleen


- No further abx





Afib


- Rate is currently controlled


- Continue patient's home atenolol 25 mg daily


- Patient having hematuria will hold Pradaxa. Will need to discuss with Urology 

when Pradaxa can be resumed





HTN (hypertension)


- Continue patient's home atenolol 25 mg daily and amlodipine 5 mg twice daily





Diabetes mellitus type 2, diet-controlled


- Diet controlled pt refusing diabetic diet





Hyperlipidemia


- Continue patient's home atorvastatin 40 mg p.o. nightly





Dementia


- Will continue patient's home carbidopa levodopa as well as Memantine





(2) Afib


ICD Codes:  I48.91 - Unspecified atrial fibrillation


(3) HTN (hypertension)


ICD Codes:  I10 - Essential (primary) hypertension


Status:  Chronic


(4) Diabetes mellitus type 2, diet-controlled


ICD Codes:  E11.9 - Type 2 diabetes mellitus without complications


Status:  Chronic


(5) Hyperlipidemia


ICD Codes:  E78.5 - Hyperlipidemia, unspecified


Status:  Chronic


(6) Dementia


ICD Codes:  F03.90 - Unspecified dementia without behavioral disturbance


Status:  Chronic


Assessment and Plan


Patient examined.


Assessment and plan formulated with Sara Roa PA-C.


I agree with the above.


if hematuria still improved tomorrow will call urology and plan for d/c











Sara Roa May 2, 2018 10:49


Tan Gutierrez MD May 2, 2018 12:13

## 2018-05-03 VITALS
DIASTOLIC BLOOD PRESSURE: 67 MMHG | SYSTOLIC BLOOD PRESSURE: 130 MMHG | RESPIRATION RATE: 18 BRPM | TEMPERATURE: 97.7 F | HEART RATE: 68 BPM | OXYGEN SATURATION: 96 %

## 2018-05-03 VITALS
OXYGEN SATURATION: 93 % | TEMPERATURE: 97.6 F | SYSTOLIC BLOOD PRESSURE: 127 MMHG | RESPIRATION RATE: 18 BRPM | DIASTOLIC BLOOD PRESSURE: 72 MMHG | HEART RATE: 78 BPM

## 2018-05-03 VITALS
DIASTOLIC BLOOD PRESSURE: 88 MMHG | RESPIRATION RATE: 16 BRPM | OXYGEN SATURATION: 95 % | TEMPERATURE: 97.6 F | HEART RATE: 78 BPM | SYSTOLIC BLOOD PRESSURE: 137 MMHG

## 2018-05-03 VITALS
RESPIRATION RATE: 18 BRPM | TEMPERATURE: 97.5 F | HEART RATE: 72 BPM | DIASTOLIC BLOOD PRESSURE: 60 MMHG | SYSTOLIC BLOOD PRESSURE: 113 MMHG | OXYGEN SATURATION: 95 %

## 2018-05-03 VITALS
TEMPERATURE: 97.3 F | SYSTOLIC BLOOD PRESSURE: 115 MMHG | RESPIRATION RATE: 16 BRPM | HEART RATE: 53 BPM | OXYGEN SATURATION: 96 % | DIASTOLIC BLOOD PRESSURE: 44 MMHG

## 2018-05-03 RX ADMIN — CARBIDOPA AND LEVODOPA SCH TAB: 25; 250 TABLET ORAL at 13:09

## 2018-05-03 RX ADMIN — CARBIDOPA AND LEVODOPA SCH TAB: 25; 250 TABLET ORAL at 08:44

## 2018-05-03 RX ADMIN — PHENYTOIN SODIUM SCH MLS/HR: 50 INJECTION INTRAMUSCULAR; INTRAVENOUS at 13:24

## 2018-05-03 RX ADMIN — ATORVASTATIN CALCIUM SCH MG: 40 TABLET, FILM COATED ORAL at 22:39

## 2018-05-03 RX ADMIN — PANTOPRAZOLE SCH MG: 40 TABLET, DELAYED RELEASE ORAL at 08:45

## 2018-05-03 RX ADMIN — VITAMIN D, TAB 1000IU (100/BT) SCH UNITS: 25 TAB at 08:45

## 2018-05-03 RX ADMIN — DONEPEZIL HYDROCHLORIDE SCH MG: 5 TABLET, FILM COATED ORAL at 22:39

## 2018-05-03 RX ADMIN — ATENOLOL SCH MG: 25 TABLET ORAL at 08:43

## 2018-05-03 RX ADMIN — MEMANTINE HYDROCHLORIDE SCH MG: 10 TABLET ORAL at 08:45

## 2018-05-03 RX ADMIN — PANTOPRAZOLE SCH MG: 40 TABLET, DELAYED RELEASE ORAL at 22:39

## 2018-05-03 RX ADMIN — MEMANTINE HYDROCHLORIDE SCH MG: 10 TABLET ORAL at 22:39

## 2018-05-03 RX ADMIN — CARBIDOPA AND LEVODOPA SCH TAB: 25; 250 TABLET ORAL at 18:20

## 2018-05-03 NOTE — HHI.PR
Subjective


Remarks


no complaints





Objective


Vitals


heart reg


lung cta


abd s/nt


ext no edema


saenz...some blood tinged urine in bag


  yellow urine in tube


Vital Signs








  Date Time  Temp Pulse Resp B/P (MAP) Pulse Ox O2 Delivery O2 Flow Rate FiO2


 


5/3/18 08:00 97.6 78 16 137/88 (104) 95   


 


5/3/18 00:01 97.6 78 18 127/72 (90) 93   


 


5/2/18 22:18      Room Air  


 


5/2/18 19:42 98.6 55 18 114/59 (77) 95   


 


5/2/18 16:00 97.7 70 18 117/66 (83) 93   














 5/3/18 5/3/18 5/4/18





 15:00 23:00 07:00


 


Output Total 1200 ml  


 


Balance -1200 ml  


 


   


 


Output Urine Total 1200 ml  








Result Diagram:  


5/2/18 0404                                                                    

            5/1/18 0425





Imaging





Last Impressions








Abdomen/Pelvis CT 4/30/18 2006 Signed





Impressions: 





 Service Date/Time:  Monday, April 30, 2018 21:00 - CONCLUSION:  1. 





 Nonobstructing 6 mm calculus left kidney. Bilateral renal cysts mostly in the 





 parapelvic region. 2. Enlarged prostate. Mild dependent density in the bladder 





 could represent some bladder hemorrhage given history of hematuria.     Jack Borges MD 











A/P


Problem List:  


(1) Hematuria


ICD Codes:  R31.9 - Hematuria, unspecified


Status:  Acute


Plan:  


Hematuria


- Pt is an 87 y/o male with hypertension, atrial fibrillation on Pradaxa, 

hypertension, hyperlipidemia, GERD, BPH status post TURP 2013, and Parkinson's 

disease with mild dementia


- He presented to the ED on 4/30/18 with complaints of hematuria


- Saenz in place irrigate as needed


- H&H stable


- Appreciate consultation from Dr. Thornton  he is recommending to maintain 

Saenz catheter to gravity drainage until urine remains clear yellow in 

appearance for at least 48 hours


  He thinks the likely source is from the enlarged vascular prostate.





Possible UTI


- Urinalysis reviewed and reveals positive nitrates patient given ciprofloxacin 

emergency department


- Urine culture reveals: <10,000 gram positive marleen


- No further abx





Afib


- Rate is currently controlled


- Continue patient's home atenolol 25 mg daily


- Patient having hematuria will hold Pradaxa. Urology says resume 5 days after 

urine is yellow.


HTN (hypertension)


- Continue patient's home atenolol 25 mg daily and amlodipine 5 mg twice daily





Diabetes mellitus type 2, diet-controlled


- Diet controlled pt refusing diabetic diet





Hyperlipidemia


- Continue patient's home atorvastatin 40 mg p.o. nightly





Dementia


- Will continue patient's home carbidopa levodopa as well as Memantine





(2) Afib


ICD Codes:  I48.91 - Unspecified atrial fibrillation


(3) HTN (hypertension)


ICD Codes:  I10 - Essential (primary) hypertension


Status:  Chronic


(4) Diabetes mellitus type 2, diet-controlled


ICD Codes:  E11.9 - Type 2 diabetes mellitus without complications


Status:  Chronic


(5) Hyperlipidemia


ICD Codes:  E78.5 - Hyperlipidemia, unspecified


Status:  Chronic


(6) Dementia


ICD Codes:  F03.90 - Unspecified dementia without behavioral disturbance


Status:  Chronic











Tan Gutierrez MD May 3, 2018 12:34

## 2018-05-04 VITALS
TEMPERATURE: 99.1 F | HEART RATE: 67 BPM | SYSTOLIC BLOOD PRESSURE: 106 MMHG | DIASTOLIC BLOOD PRESSURE: 64 MMHG | OXYGEN SATURATION: 95 % | RESPIRATION RATE: 16 BRPM

## 2018-05-04 VITALS
TEMPERATURE: 98.5 F | OXYGEN SATURATION: 96 % | HEART RATE: 79 BPM | DIASTOLIC BLOOD PRESSURE: 73 MMHG | SYSTOLIC BLOOD PRESSURE: 145 MMHG | RESPIRATION RATE: 18 BRPM

## 2018-05-04 VITALS
OXYGEN SATURATION: 93 % | TEMPERATURE: 97.7 F | RESPIRATION RATE: 16 BRPM | HEART RATE: 81 BPM | DIASTOLIC BLOOD PRESSURE: 80 MMHG | SYSTOLIC BLOOD PRESSURE: 133 MMHG

## 2018-05-04 VITALS
TEMPERATURE: 98.3 F | HEART RATE: 75 BPM | RESPIRATION RATE: 16 BRPM | OXYGEN SATURATION: 96 % | DIASTOLIC BLOOD PRESSURE: 60 MMHG | SYSTOLIC BLOOD PRESSURE: 115 MMHG

## 2018-05-04 VITALS
HEART RATE: 69 BPM | SYSTOLIC BLOOD PRESSURE: 130 MMHG | DIASTOLIC BLOOD PRESSURE: 67 MMHG | RESPIRATION RATE: 16 BRPM | TEMPERATURE: 98.6 F | OXYGEN SATURATION: 95 %

## 2018-05-04 RX ADMIN — PHENYTOIN SODIUM SCH MLS/HR: 50 INJECTION INTRAMUSCULAR; INTRAVENOUS at 11:15

## 2018-05-04 RX ADMIN — CARBIDOPA AND LEVODOPA SCH TAB: 25; 250 TABLET ORAL at 11:12

## 2018-05-04 RX ADMIN — MEMANTINE HYDROCHLORIDE SCH MG: 10 TABLET ORAL at 21:33

## 2018-05-04 RX ADMIN — VITAMIN D, TAB 1000IU (100/BT) SCH UNITS: 25 TAB at 08:05

## 2018-05-04 RX ADMIN — DOCUSATE SODIUM SCH MG: 100 CAPSULE, LIQUID FILLED ORAL at 21:33

## 2018-05-04 RX ADMIN — PANTOPRAZOLE SCH MG: 40 TABLET, DELAYED RELEASE ORAL at 08:05

## 2018-05-04 RX ADMIN — PANTOPRAZOLE SCH MG: 40 TABLET, DELAYED RELEASE ORAL at 21:33

## 2018-05-04 RX ADMIN — DOCUSATE SODIUM SCH MG: 100 CAPSULE, LIQUID FILLED ORAL at 13:57

## 2018-05-04 RX ADMIN — PHENYTOIN SODIUM SCH MLS/HR: 50 INJECTION INTRAMUSCULAR; INTRAVENOUS at 03:42

## 2018-05-04 RX ADMIN — ATORVASTATIN CALCIUM SCH MG: 40 TABLET, FILM COATED ORAL at 21:33

## 2018-05-04 RX ADMIN — ATENOLOL SCH MG: 25 TABLET ORAL at 08:09

## 2018-05-04 RX ADMIN — DONEPEZIL HYDROCHLORIDE SCH MG: 5 TABLET, FILM COATED ORAL at 21:33

## 2018-05-04 RX ADMIN — CARBIDOPA AND LEVODOPA SCH TAB: 25; 250 TABLET ORAL at 08:05

## 2018-05-04 RX ADMIN — MEMANTINE HYDROCHLORIDE SCH MG: 10 TABLET ORAL at 08:05

## 2018-05-04 RX ADMIN — CARBIDOPA AND LEVODOPA SCH TAB: 25; 250 TABLET ORAL at 17:20

## 2018-05-04 NOTE — HHI.PR
Subjective


Remarks


left knee swollen.


urine yellow





Objective


Vitals


heart reg


lung cta


abd s/nt


ext left knee mild effusion and reduced rom


saenz. yellow.


Vital Signs








  Date Time  Temp Pulse Resp B/P (MAP) Pulse Ox O2 Delivery O2 Flow Rate FiO2


 


5/4/18 11:47 98.3 75 16 115/60 (78) 96   


 


5/4/18 08:00 97.7 81 16 133/80 (97) 93   


 


5/4/18 07:31      Room Air  


 


5/4/18 00:01 98.5 79 18 145/73 (97) 96   


 


5/3/18 20:00 97.7 68 18 130/67 (88) 96   


 


5/3/18 15:21 97.5 72 18 113/60 (77) 95   








Result Diagram:  


5/2/18 0404                                                                    

            5/1/18 0425





Imaging





Last Impressions








Abdomen/Pelvis CT 4/30/18 2006 Signed





Impressions: 





 Service Date/Time:  Monday, April 30, 2018 21:00 - CONCLUSION:  1. 





 Nonobstructing 6 mm calculus left kidney. Bilateral renal cysts mostly in the 





 parapelvic region. 2. Enlarged prostate. Mild dependent density in the bladder 





 could represent some bladder hemorrhage given history of hematuria.     Jack Borges MD 











A/P


Problem List:  


(1) Hematuria


ICD Codes:  R31.9 - Hematuria, unspecified


Status:  Acute


Plan:  


Hematuria


- Pt is an 85 y/o male with hypertension, atrial fibrillation on Pradaxa, 

hypertension, hyperlipidemia, GERD, BPH status post TURP 2013, and Parkinson's 

disease with mild dementia


- He presented to the ED on 4/30/18 with complaints of hematuria


- Saenz in place irrigate as needed


- H&H stable


- Appreciate consultation from Dr. Thornton  he is recommending to maintain 

Saenz catheter to gravity drainage until urine remains clear yellow in 

appearance for at least 48 hours


  He thinks the likely source is from the enlarged vascular prostate.





if urine clear in AM..then d/c saenz





His left knee is swollen. most likely reactive from arthritis. dose prednisone 

and recheck.





Possible UTI


- Urinalysis reviewed and reveals positive nitrates patient given ciprofloxacin 

emergency department


- Urine culture reveals: <10,000 gram positive marleen


- No further abx





Afib


- Rate is currently controlled


- Continue patient's home atenolol 25 mg daily


- Patient having hematuria will hold Pradaxa. Urology says resume 5 days after 

urine is yellow.


HTN (hypertension)


- Continue patient's home atenolol 25 mg daily and amlodipine 5 mg twice daily





Diabetes mellitus type 2, diet-controlled


- Diet controlled pt refusing diabetic diet





Hyperlipidemia


- Continue patient's home atorvastatin 40 mg p.o. nightly





Dementia


- Will continue patient's home carbidopa levodopa as well as Memantine





(2) Afib


ICD Codes:  I48.91 - Unspecified atrial fibrillation


(3) HTN (hypertension)


ICD Codes:  I10 - Essential (primary) hypertension


Status:  Chronic


(4) Diabetes mellitus type 2, diet-controlled


ICD Codes:  E11.9 - Type 2 diabetes mellitus without complications


Status:  Chronic


(5) Hyperlipidemia


ICD Codes:  E78.5 - Hyperlipidemia, unspecified


Status:  Chronic


(6) Dementia


ICD Codes:  F03.90 - Unspecified dementia without behavioral disturbance


Status:  Chronic











Tan Gutierrez MD May 4, 2018 13:25

## 2018-05-05 VITALS
TEMPERATURE: 98.2 F | DIASTOLIC BLOOD PRESSURE: 68 MMHG | SYSTOLIC BLOOD PRESSURE: 123 MMHG | HEART RATE: 83 BPM | OXYGEN SATURATION: 94 % | RESPIRATION RATE: 17 BRPM

## 2018-05-05 VITALS
SYSTOLIC BLOOD PRESSURE: 116 MMHG | TEMPERATURE: 98.6 F | DIASTOLIC BLOOD PRESSURE: 80 MMHG | HEART RATE: 86 BPM | RESPIRATION RATE: 17 BRPM | OXYGEN SATURATION: 96 %

## 2018-05-05 VITALS
TEMPERATURE: 97.2 F | OXYGEN SATURATION: 94 % | DIASTOLIC BLOOD PRESSURE: 56 MMHG | HEART RATE: 55 BPM | SYSTOLIC BLOOD PRESSURE: 108 MMHG | RESPIRATION RATE: 17 BRPM

## 2018-05-05 RX ADMIN — ATENOLOL SCH MG: 25 TABLET ORAL at 10:19

## 2018-05-05 RX ADMIN — DOCUSATE SODIUM SCH MG: 100 CAPSULE, LIQUID FILLED ORAL at 10:20

## 2018-05-05 RX ADMIN — VITAMIN D, TAB 1000IU (100/BT) SCH UNITS: 25 TAB at 10:19

## 2018-05-05 RX ADMIN — MEMANTINE HYDROCHLORIDE SCH MG: 10 TABLET ORAL at 10:19

## 2018-05-05 RX ADMIN — CARBIDOPA AND LEVODOPA SCH TAB: 25; 250 TABLET ORAL at 14:38

## 2018-05-05 RX ADMIN — PANTOPRAZOLE SCH MG: 40 TABLET, DELAYED RELEASE ORAL at 10:19

## 2018-05-05 RX ADMIN — CARBIDOPA AND LEVODOPA SCH TAB: 25; 250 TABLET ORAL at 10:19

## 2018-05-05 NOTE — HHI.PR
Subjective


Remarks


left knee pain appears resolved. able to bend the knee


eager for d/c. urine clear.





Objective


Vitals


heart reg


lung cta


abd s/nt


ext left knee swelling better


 has FROM now.


saenz. yellow urine.


Vital Signs








  Date Time  Temp Pulse Resp B/P (MAP) Pulse Ox O2 Delivery O2 Flow Rate FiO2


 


5/5/18 08:00 98.6 86 17 127/80 (96) 95   


 


5/5/18 00:00 98.2 83 17 123/68 (86) 94   


 


5/4/18 20:00 99.1 67 16 106/64 (78) 95   


 


5/4/18 16:00 98.6 69 16 130/67 (88) 95   


 


5/4/18 11:47 98.3 75 16 115/60 (78) 96   














 5/5/18 5/5/18 5/6/18





 15:00 23:00 07:00


 


Intake Total 450 ml  


 


Output Total 650 ml  


 


Balance -200 ml  


 


   


 


Intake Oral 450 ml  


 


Output Urine Total 650 ml  








Result Diagram:  


5/2/18 0404                                                                    

            5/1/18 0425





Imaging





Last Impressions








Abdomen/Pelvis CT 4/30/18 2006 Signed





Impressions: 





 Service Date/Time:  Monday, April 30, 2018 21:00 - CONCLUSION:  1. 





 Nonobstructing 6 mm calculus left kidney. Bilateral renal cysts mostly in the 





 parapelvic region. 2. Enlarged prostate. Mild dependent density in the bladder 





 could represent some bladder hemorrhage given history of hematuria.     Jack Borges MD 











A/P


Problem List:  


(1) Hematuria


ICD Codes:  R31.9 - Hematuria, unspecified


Status:  Acute


Plan:  


Hematuria


- Pt is an 87 y/o male with hypertension, atrial fibrillation on Pradaxa, 

hypertension, hyperlipidemia, GERD, BPH status post TURP 2013, and Parkinson's 

disease with mild dementia


- He presented to the ED on 4/30/18 with complaints of hematuria


- Saenz in place irrigate as needed


- H&H stable


- Appreciate consultation from Dr. Thornton  he is recommending to maintain 

Saenz catheter to gravity drainage until urine remains clear yellow in 

appearance for at least 48 hours


  He thinks the likely source is from the enlarged vascular prostate.





urine yellow. remove saenz\


left knee effusion and pain resolved with a dose of prednisone


reevaluate his ambulation status with PT today before deciding on home...wife 

is concerned about his balance.





Possible UTI


- Urinalysis reviewed and reveals positive nitrates patient given ciprofloxacin 

emergency department


- Urine culture reveals: <10,000 gram positive marleen


- No further abx





Afib


- Rate is currently controlled


- Continue patient's home atenolol 25 mg daily


- Patient having hematuria will hold Pradaxa. Urology says resume 5 days after 

urine is yellow.


HTN (hypertension)


- Continue patient's home atenolol 25 mg daily and amlodipine 5 mg twice daily





Diabetes mellitus type 2, diet-controlled


- Diet controlled pt refusing diabetic diet





Hyperlipidemia


- Continue patient's home atorvastatin 40 mg p.o. nightly





Dementia


- Will continue patient's home carbidopa levodopa as well as Memantine





(2) Afib


ICD Codes:  I48.91 - Unspecified atrial fibrillation


(3) HTN (hypertension)


ICD Codes:  I10 - Essential (primary) hypertension


Status:  Chronic


(4) Diabetes mellitus type 2, diet-controlled


ICD Codes:  E11.9 - Type 2 diabetes mellitus without complications


Status:  Chronic


(5) Hyperlipidemia


ICD Codes:  E78.5 - Hyperlipidemia, unspecified


Status:  Chronic


(6) Dementia


ICD Codes:  F03.90 - Unspecified dementia without behavioral disturbance


Status:  Chronic











Tan Gutierrez MD May 5, 2018 10:52

## 2018-05-05 NOTE — HHI.DCPOC
Discharge Care Plan


Diagnosis:  


(1) Hematuria


(2) Atrial fibrillation


(3) Diabetes mellitus type 2, diet-controlled


(4) HTN (hypertension)


(5) Dementia


(6) Parkinson disease


Goals to Promote Your Health


* To prevent worsening of your condition and complications


* To maintain your health at the optimal level


Directions to Meet Your Goals


*** Take your medications as prescribed


*** Follow your dietary instruction


*** Follow activity as directed








*** Keep your appointments as scheduled


*** Take your immunizations and boosters as scheduled


*** If your symptoms worsen call your PCP, if no PCP go to Urgent Care Center 

or Emergency Room***


*** Smoking is Dangerous to Your Health. Avoid second hand smoke***


***Call the 24-hour hour crisis hotline for domestic abuse at 1-945.107.1771***











Tan Gutierrez MD May 5, 2018 12:00